# Patient Record
Sex: FEMALE | Race: WHITE | NOT HISPANIC OR LATINO | URBAN - METROPOLITAN AREA
[De-identification: names, ages, dates, MRNs, and addresses within clinical notes are randomized per-mention and may not be internally consistent; named-entity substitution may affect disease eponyms.]

---

## 2017-04-19 ENCOUNTER — ALLSCRIPTS OFFICE VISIT (OUTPATIENT)
Dept: OTHER | Facility: OTHER | Age: 67
End: 2017-04-19

## 2017-06-15 ENCOUNTER — ALLSCRIPTS OFFICE VISIT (OUTPATIENT)
Dept: OTHER | Facility: OTHER | Age: 67
End: 2017-06-15

## 2017-10-03 ENCOUNTER — GENERIC CONVERSION - ENCOUNTER (OUTPATIENT)
Dept: OTHER | Facility: OTHER | Age: 67
End: 2017-10-03

## 2017-10-19 ENCOUNTER — GENERIC CONVERSION - ENCOUNTER (OUTPATIENT)
Dept: OTHER | Facility: OTHER | Age: 67
End: 2017-10-19

## 2017-10-19 ENCOUNTER — LAB CONVERSION - ENCOUNTER (OUTPATIENT)
Dept: OTHER | Facility: OTHER | Age: 67
End: 2017-10-19

## 2017-10-19 LAB
T4 FREE SERPL-MCNC: 1.4 NG/DL (ref 0.8–1.8)
THYROGLOBULIN AB (HISTORICAL): <1 IU/ML
TSH SERPL DL<=0.05 MIU/L-ACNC: 0.4 MIU/L (ref 0.4–4.5)

## 2017-11-13 ENCOUNTER — HOSPITAL ENCOUNTER (OUTPATIENT)
Dept: RADIOLOGY | Age: 67
Discharge: HOME | End: 2017-11-13
Payer: COMMERCIAL

## 2017-11-13 DIAGNOSIS — C73 MALIGNANT NEOPLASM OF THYROID GLAND (HCC): ICD-10-CM

## 2017-11-13 PROCEDURE — 76536 US EXAM OF HEAD AND NECK: CPT

## 2017-11-17 ENCOUNTER — GENERIC CONVERSION - ENCOUNTER (OUTPATIENT)
Dept: OTHER | Facility: OTHER | Age: 67
End: 2017-11-17

## 2017-11-29 ENCOUNTER — LAB REQUISITION (OUTPATIENT)
Dept: LAB | Facility: HOSPITAL | Age: 67
End: 2017-11-29
Payer: COMMERCIAL

## 2017-11-29 ENCOUNTER — ALLSCRIPTS OFFICE VISIT (OUTPATIENT)
Dept: OTHER | Facility: OTHER | Age: 67
End: 2017-11-29

## 2017-11-29 DIAGNOSIS — Z01.419 ENCOUNTER FOR GYNECOLOGICAL EXAMINATION WITHOUT ABNORMAL FINDING: ICD-10-CM

## 2017-11-29 PROCEDURE — G0145 SCR C/V CYTO,THINLAYER,RESCR: HCPCS | Performed by: OBSTETRICS & GYNECOLOGY

## 2017-12-05 NOTE — PROGRESS NOTES
Assessment    1  Encounter for routine gynecological examination (V72 31) (Z01 419)   2  Pap smear, as part of routine gynecological examination (V76 2) (Z01 419)   3  Screening for osteoporosis (V82 81) (Z13 820)   4  Visit for screening mammogram (V76 12) (Z12 31)    Plan  Encounter for routine gynecological examination    · Follow-up visit in 1 year Evaluation and Treatment  Follow-up  Status: Hold For -  Scheduling  Requested for: [id number]   Ordered; For: Encounter for routine gynecological examination; Ordered By: Pura Basurto Performed:  Due: [id number]  Encounter for routine gynecological examination, Pap smear, as part of routine  gynecological examination    · (1) THIN PREP PAP WITH IMAGING; Status: In Progress - Specimen/Data  Collected,Retrospective By Protocol Authorization;   Done: [id number]   Perform:[facility] In Office Collection; Order Comments:CERVICAL/ENDOCERVICAL; CIB:[id number]; Last Updated By:Kimmie Jackman; 11/29/2017 10:14:24 AM;Ordered;  For:Encounter for routine gynecological examination, Pap smear, as part of routine gynecological examination; Ordered By:Tatum Wall; Maturation index required? : No  HPV? : if ASCUS  Screening for osteoporosis    · * DXA BONE DENSITY SPINE HIP AND PELVIS; Status:Hold For - Scheduling,Exact  Date,Retrospective By Protocol Authorization; Requested for:Oct 2018; Perform:[facility]; Due:[id number]; Last Updated By:Kimmie Jackman; 11/29/2017 10:14:24 AM;Ordered; For:Screening for osteoporosis; Ordered By:Tatum Wall; Visit for screening mammogram    · * MAMMO SCREENING BILATERAL W CAD; Status:Hold For - Scheduling,Exact  Date,Retrospective By Protocol Authorization; Requested for:Oct 2018; Perform:[facility]; Due:01Oct2020; Last Updated By:Kimmie Jackman; 11/29/2017 10:14:24 AM;Ordered; For:Visit for screening mammogram; Ordered By:Tatum Wall;     Discussion/Summary  healthy adult female Currently, she eats a healthy diet  cervical cancer screening is current Pap test was done today Breast cancer screening: monthly self breast exam was advised, mammogram is current and mammogram has been ordered  Colorectal cancer screening: colorectal cancer screening is current  Osteoporosis screening: bone mineral density testing is current  Advice and education were given regarding nutrition, aerobic exercise, calcium supplements, vitamin D supplements and sunscreen use  Normal GYN Exam    SBE Stressed    MAmmogram ordered    Pap SMear DOne    Follow-up GYN Exam in 1 year    Call if any problems develop during the interim       ****Use a PEDIATRIC SPECULUM  The patient has the current Goals: [address]  The patent has the current Barriers: None  Patient is able to Self-Care  PATIENT EDUCATION RECORD She was given the following educational materials:  Ideas for a Healthy Life Style  The treatment plan was reviewed with the patient/guardian  The patient/guardian understands and agrees with the treatment plan     Self Referrals: No      Chief Complaint  ANNUAL VISIT  PT HAS NO PROBLEM OR CONCERN  PT IS DOING VERY GOOD      History of Present Illness  HPI: tREATED FOR thyroid    No new medical problems     Normal Bowel & Bladder habits    No abdominal or pelvic pain   GYN , Adult Female [facility]: The patient is being seen for a gynecology evaluation  The last health maintenance visit was 1 year(s) ago  General Health: The patient's health since the last visit is described as good  She has regular dental visits  She denies vision problems  She denies hearing loss  Lifestyle:  She consumes a diverse and healthy diet  She does not have any weight concerns  She exercises regularly  She does not use tobacco  She denies drug use  Reproductive health: the patient is postmenopausal    Screening: cancer screening reviewed and current  metabolic screening reviewed and current  risk screening reviewed and current  Review of Systems    Constitutional: No fever, no chills, feels well, no tiredness, no recent weight gain or loss  ENT: no ear ache, no loss of hearing, no nosebleeds or nasal discharge, no sore throat or hoarseness  Cardiovascular: THYROID CA History, but no complaints of slow or fast heart rate, no chest pain, no palpitations, no leg claudication or lower extremity edema  Respiratory: no complaints of shortness of breath, no wheezing, no dyspnea on exertion, no orthopnea or PND  Breasts: no complaints of breast pain, breast lump or nipple discharge  Gastrointestinal: no complaints of abdominal pain, no constipation, no nausea or diarrhea, no vomiting, no bloody stools  Genitourinary: no complaints of dysuria, no incontinence, no pelvic pain, no dysmenorrhea, no vaginal discharge or abnormal vaginal bleeding  Musculoskeletal: no complaints of arthralgia, no myalgia, no joint swelling or stiffness, no limb pain or swelling  Integumentary: no complaints of skin rash or lesion, no itching or dry skin, no skin wounds  Neurological: no complaints of headache, no confusion, no numbness or tingling, no dizziness or fainting  Active Problems    1  Benign nevus (216 9) (D22 9)   2  Cherry angioma (448 1) (I78 1)   3  Encounter for routine gynecological examination (V72 31) (Z01 419)   4  Excessive cerumen in right ear canal (380 4) (H61 21)   5  Hyperlipidemia (272 4) (E78 5)   6  Impaired fasting glucose (790 21) (R73 01)   7  Long term use of drug (V58 69) (Z79 899)   8  Need for influenza vaccination (V04 81) (Z23)   9  Need for vaccination for pneumococcus (V03 82) (Z23)   10  Osteopenia (733 90) (M85 80)   11  Pap smear, as part of routine gynecological examination (V76 2) (Z01 419)   12  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)   13  Postsurgical hypothyroidism (244 0) (E89 0)   14  Screening for cardiovascular condition (V81 2) (Z13 6)   15   Screening for osteoporosis (V82 81) (Z13 820)   16   Visit for screening mammogram (V76 12) (Z12 31)    Past Medical History    · History of DEXA Body Composition Study Lumbosacral Spine, Femur, Forearm   · History of Encounter for routine gynecological examination (V72 31) (Z01 419)   · History of Encounter for screening mammogram for malignant neoplasm of breast  (V76 12) (Z12 31)   · History of Fecal occult blood test positive (792 1) (R19 5)   · History of seborrheic keratosis (V13 3) (Z87 2)   · History of Laboratory examination ordered as part of a routine general medical  examination (V72 62) (Z00 00)   · History of Mammogram   · History of Pap smear, as part of routine gynecological examination (V76 2) (Z01 419)   · 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)   · History of Reported Pap Smear   · History of Routine Gynecological Exam With Cervical Pap Smear (V72 31)   · History of Ulcerative colitis without complications (103 7) (T36 26)   · History of Vaginal atrophy (627 3) (N95 2)   · History of Visit for screening mammogram (V76 12) (Z12 31)    Surgical History    · History of Oral Surgery Tooth Extraction   · History of Thyroid Surgery Total Thyroidectomy    Family History  Mother    · Family history of Family Health Status Mother   · Family history of chronic obstructive pulmonary disease (V17 6) (Z82 5)   · Family history of Generalized seizure disorder   · Family history of Mother  At Age 66   · Family history of Transient ischemic attack (TIA), and cerebral infarction without residual  deficits  Father    · Family history of Family Health Status Father   · Family history of Family Health Status Of Sister - Alive   · Family history of hypertension (V17 49) (Z82 49)   · Family history of Murmurs   · Family history of Pacemaker Placement  Maternal Grandmother    · Family history of diabetes mellitus (V18 0) (Z83 3)  Family History    · Family history of Cancer    Social History    · Denied: History of Drug Use   · Feels safe at home   · Denied: History of Home Environment Domestic Violence   · Minimum alcohol consumption   · Never smoked tobacco (V49 89) (Z78 9)    Current Meds   1  Aspirin Adult Low Strength 81 MG Oral Tablet Delayed Release; TAKE 1 TABLET DAILY; Therapy: 82JVA8389 to  Requested for: 58QSV1143 Recorded   2  Caltrate 600+D Plus Minerals 600-800 MG-UNIT Oral Tablet; Take 1 tablet daily; Therapy: 64UYE9258 to Recorded   3  Centrum Silver Ultra Womens Oral Tablet; TAKE 1 TABLET DAILY; Therapy: ([id number]) to Recorded   4  Synthroid 75 MCG Oral Tablet; 1 Tab Mon-Sat, 1/2 Tab on Sunday; Therapy: 23OIA2427 to (Evaluate:14Apr2018)  Requested for: 19Apr2017; Last   Rx:19Apr2017 Ordered    Allergies    1  Mesalamine ENEM    2  No Known Environmental Allergies   3  No Known Food Allergies    Vitals   Recorded: 04USI4451 60:39JM   Systolic 553   Diastolic 74   Height 5 ft 7 in   Weight 130 lb 6 oz   BMI Calculated 20 42   BSA Calculated 1 69     Physical Exam    Constitutional   General appearance: No acute distress, well appearing and well nourished  Neck   Neck: Normal, supple, trachea midline, no masses  Thyroid: Normal, no thyromegaly  Pulmonary   Respiratory effort: No increased work of breathing or signs of respiratory distress  Auscultation of lungs: Clear to auscultation  Cardiovascular   Auscultation of heart: Normal rate and rhythm, normal S1 and S2, no murmurs  Peripheral vascular exam: Normal pulses Throughout  Genitourinary   External genitalia: Normal and no lesions appreciated  Vagina: Normal, no lesions or dryness appreciated  Urethra: Normal     Urethral meatus: Normal     Bladder: Normal, soft, non-tender and no prolapse or masses appreciated  Cervix: Normal, no palpable masses  Uterus: Normal, non-tender, not enlarged, and no palpable masses  Adnexa/parametria: Normal, non-tender and no fullness or masses appreciated      Anus, perineum, and rectum: Normal sphincter tone, no masses, and no prolapse  Chest   Breasts: Normal and no dimpling or skin changes noted  Abdomen   Abdomen: Normal, non-tender, and no organomegaly noted  Liver and spleen: No hepatomegaly or splenomegaly  Examination for hernias: No hernias appreciated  Stool sample for occult blood: Negative  Lymphatic   Palpation of lymph nodes in neck, axillae, groin and/or other locations: No lymphadenopathy or masses noted  Skin   Skin and subcutaneous tissue: Normal skin turgor and no rashes  Palpation of skin and subcutaneous tissue: Normal     Psychiatric   Orientation to person, place, and time: Normal     Mood and affect: Normal        Provider Comments  Provider Comments:   Patient retired      Cares for 80 yr old father      Future Appointments    Date/Time Provider Specialty Site   04/20/2018 11:10 AM KEEGAN Baeza  Endocrinology [facility]   11/30/2018 09:30 AM KEEGAN Tamez  Surgical Oncology [facility]   06/19/2018 10:30 AM KEEGAN Scott   Internal Medicine [facility]     Signatures   Electronically signed by : KEEGAN Tellez ; Nov 30 2017  1:47PM EST                       (Author)

## 2017-12-06 LAB
LAB AP GYN PRIMARY INTERPRETATION: NORMAL
Lab: NORMAL

## 2018-01-10 NOTE — RESULT NOTES
Verified Results  (1) THYROGLOBULIN/QUANT W/ANTIBODY PANEL 20Apr2016 04:38PM Lillian Lam   Performed at:  [address]  [id number]  : Leonid Hussein MD, Phone:  [phone]     Test Name Result Flag Reference   THYROGLOB AB <1 0 IU/mL  0 0 - 0 9   Thyroglobulin Antibody measured by Rayne Norman Methodology   THYROGLOBULIN-SOLITARIO <0 1 ng/mL L 1 5 - 38 5   According to the [facility] of Clinical Biochemistry, thereference interval for Thyroglobulin (TG) should be related toeuthyroid patients and not for patients who underwent thyroidectomy  TG reference intervals for these patients depend on the residualmass of the thyroid tissue left after surgery  Establishing apost-operative baseline is recommended  The assay limit of quantitation is 0 1 ng/mLThyroglobulin measured by Rayne Norman Immunometric Assay       Plan  Postsurgical hypothyroidism    · (1) T4, FREE; Status:Active; Requested for:22Oct2016;    · (1) TSH; Status:Active;  Requested for:22Oct2016;

## 2018-01-11 NOTE — RESULT NOTES
Verified Results  (Q) CBC (INCLUDES DIFF/PLT) (REFL) [id number] 07:48AM Prabhu Patiño   REPORT COMMENT:  REQ ON HOLD     Test Name Result Flag Reference   WHITE BLOOD CELL COUNT 4 6 Thousand/uL  3 8-10 8   RED BLOOD CELL COUNT 4 14 Million/uL  3 80-5 10   HEMOGLOBIN 12 2 g/dL  11 7-15 5   HEMATOCRIT 37 2 %  35 0-45 0   MCV 89 9 fL  80 0-100 0   MCH 29 6 pg  27 0-33 0   MCHC 32 9 g/dL  32 0-36 0   RDW 13 5 %  11 0-15 0   PLATELET COUNT 711 Thousand/uL  140-400   MPV 7 8 fL  7 5-11 5   ABSOLUTE NEUTROPHILS 2875 cells/uL  8436-6081   ABSOLUTE LYMPHOCYTES 1118 cells/uL  850-3900   ABSOLUTE MONOCYTES 396 cells/uL  200-950   ABSOLUTE EOSINOPHILS 202 cells/uL     ABSOLUTE BASOPHILS 9 cells/uL  0-200   NEUTROPHILS 62 5 %     LYMPHOCYTES 24 3 %     MONOCYTES 8 6 %     EOSINOPHILS 4 4 %     BASOPHILS 0 2 %       (Q) COMPREHENSIVE METABOLIC PNL W/ADJUSTED CALCIUM [id number] 07:48AM Prabhu Patiño     Test Name Result Flag Reference   GLUCOSE 94 mg/dL  65-99   Fasting reference interval   UREA NITROGEN (BUN) 15 mg/dL  7-25   CREATININE 0 67 mg/dL  0 50-0 99   For patients >52years of age, the reference limit  for Creatinine is approximately 13% higher for people  identified as -American  eGFR NON-AFR   AMERICAN 93 mL/min/1 73m2  > OR = 60   eGFR AFRICAN AMERICAN 108 mL/min/1 73m2  > OR = 60   BUN/CREATININE RATIO   0-54   NOT APPLICABLE (calc)   SODIUM 140 mmol/L  135-146   POTASSIUM 3 8 mmol/L  3 5-5 3   CHLORIDE 103 mmol/L     CARBON DIOXIDE 27 mmol/L  19-30   CALCIUM 9 5 mg/dL  8 6-10 4   CALCIUM (ADJUSTED FOR$ALBUMIN) 9 7 mg/dL (calc)  8 6-10 2   PROTEIN, TOTAL 6 9 g/dL  6 1-8 1   ALBUMIN 4 1 g/dL  3 6-5 1   GLOBULIN 2 8 g/dL (calc)  1 9-3 7   ALBUMIN/GLOBULIN RATIO 1 5 (calc)  1 0-2 5   BILIRUBIN, TOTAL 1 0 mg/dL  0 2-1 2   ALKALINE PHOSPHATASE 61 U/L     AST 24 U/L  10-35   ALT 16 U/L  6-29     (Q) LIPID PANEL WITH REFLEX TO DIRECT LDL [id number] 07:48AM Prabhu Patiño     Test Name Result Flag Reference   CHOLESTEROL, TOTAL 238 mg/dL H 125-200   HDL CHOLESTEROL 60 mg/dL  > OR = 46   TRIGLICERIDES 884 mg/dL  <150   LDL-CHOLESTEROL 152 mg/dL (calc) H <130   Desirable range <100 mg/dL for patients with CHD or  diabetes and <70 mg/dL for diabetic patients with  known heart disease  CHOL/HDLC RATIO 4 0 (calc)  < OR = 5 0   NON HDL CHOLESTEROL 178 mg/dL (calc) H    Target for non-HDL cholesterol is 30 mg/dL higher than   LDL cholesterol target

## 2018-01-11 NOTE — RESULT NOTES
Message   Normal     Verified Results  (1) T4, FREE 24Oct2016 09:48AM Babak, [employer]     Test Name Result Flag Reference   T4, FREE 1 3 ng/dL  0 8-1 8     (Q) TSH, 3RD GENERATION 24Oct2016 09:48AM Babak, [employer]   REPORT COMMENT:  FASTING:YES     Test Name Result Flag Reference   TSH 0 45 mIU/L  0 40-4 50

## 2018-01-13 VITALS
SYSTOLIC BLOOD PRESSURE: 130 MMHG | HEIGHT: 67 IN | BODY MASS INDEX: 20.46 KG/M2 | DIASTOLIC BLOOD PRESSURE: 74 MMHG | WEIGHT: 130.38 LBS

## 2018-01-13 NOTE — RESULT NOTES
Verified Results  (1) T4, FREE 20Apr2016 04:38PM Lillian Fritz     Test Name Result Flag Reference   T4,FREE 1 33 ng/dL  0 76-1 46     (1) TSH 20Apr2016 04:38PM Jameel Hilliard   Patients undergoing fluorescein dye angiography may retain small amounts of fluorescein in the body for 48-72 hours post procedure  Samples containing fluorescein can produce falsely depressed TSH values  If the patient had this procedure,a specimen should be resubmitted post fluorescein clearance          The recommended reference ranges for TSH during pregnancy are as follows:  First trimester 0 1 to 2 5 uIU/mL  Second trimester  0 2 to 3 0 uIU/mL  Third trimester 0 3 to 3 0 uIU/m     Test Name Result Flag Reference   TSH 0 401 uIU/mL  0 358-3 740

## 2018-01-13 NOTE — PROGRESS NOTES
Assessment    1  Encounter for preventive health examination (V70 0) (Z00 00)   2  Hyperlipidemia (272 4) (E78 5)   · We did discuss patient's cholesterol levels today she is a low-risk individual and      therefore an LDL goal of less than 160 is acceptable  We did give her step      one American Heart Association diet to try to do better for next year  3  Impaired fasting glucose (790 21) (R73 01)   · Patient's fasting blood sugar this year was less than 100  She will continue with      therapeutic lifestyle changes and laboratory surveillance   4  Osteopenia (733 90) (M85 80)   · Patient's osteopenia as managed by Dr Bhavana Lancaster she is due for a DEXA scan this August      and this has scheduled her ready   5  Never smoked tobacco (V49 89) (Z78 9)   6  Minimum alcohol consumption   7  Family history of chronic obstructive pulmonary disease (V17 6) (Z82 5) : Mother   8  Family history of Generalized seizure disorder : Mother   5  Family history of Transient ischemic attack (TIA), and cerebral infarction without residual   deficits : Mother   8  Family history of hypertension (V17 49) (Z82 49) : Father   6  Family history of Pacemaker Placement : Father   15  Family history of diabetes mellitus (V18 0) (Z83 3) : Maternal Grandmother   13  History of Oral Surgery Tooth Extraction   14  History of Thyroid Surgery Total Thyroidectomy   15  Long term use of drug (V58 69) (Z79 899)    Plan   Health Maintenance    · Always use a seat belt and shoulder strap when riding or driving a motor vehicle ;  Status:Complete;   Done: 80ZRT1991   · Begin a limited exercise program ; Status:Complete;   Done: 44WNS0948   · Brush your teeth freq1 and floss at least once a day ; Status:Complete;   Done:  80NWJ7055   · Use a sun block product with an SPF of 15 or more ; Status:Complete;   Done:  90FRP1311   · We recommend routine visits to a dentist ; Status:Complete;   Done: 68XNT5338   · Call [phone] if:  You find a new or different kind of lump in your breast ;  Status:Complete;   Done: 67DYH7814   · Call [phone] if: You have any bleeding from the vagina ; Status:Complete;    Done: 82WVF6785   · Call [phone] if: You have any warning signs of skin cancer ; Status:Complete;    Done: 18RWE6181   · Call 911 if: You experience a new kind of chest pain (angina) or pressure ;  Status:Complete;   Done: 96ITO5668  Health Maintenance, Hyperlipidemia, Impaired fasting glucose, Long term use of drug,  Osteopenia    · (Q) HEPATITIS C ANTIBODY; Status:Active; Requested BBY:39CGO3164;    · (Q) LIPID PANEL WITH REFLEX TO DIRECT LDL; Status:Active; Requested  ATU:79KQV9934;   Hyperlipidemia, Impaired fasting glucose, Long term use of drug, Osteopenia    · (Q) CBC (INCLUDES DIFF/PLT) (REFL); Status:Active; Requested [id number];    · (Q) COMPREHENSIVE METABOLIC PNL W/ADJUSTED CALCIUM; Status:Active; Requested FAP:77DOY7556; Follow-up visit in 1 year Evaluation and Treatment  Follow-up  Status: Hold For - Scheduling  Requested for: 45KYQ0607  Ordered; For: Health Maintenance, Hyperlipidemia, Impaired fasting glucose, Long term use of drug, Osteopenia; Ordered By: Leslee Watts  Performed:   Due: 65EEW0984     Discussion/Summary  health maintenance visit healthy adult female Currently, she eats an adequate diet and has an adequate exercise regimen  cervical cancer screening is current Breast cancer screening: mammogram is current  Colorectal cancer screening: colorectal cancer screening is current  Osteoporosis screening: bone mineral density testing is current  The immunizations are up to date  Advice and education were given regarding sunscreen use and seat belt use  Patient discussion: discussed with the patient  Chief Complaint  The patient presents for a wellness visit  History of Present Illness  HM, Adult Female: The patient is being seen for a health maintenance evaluation   The last health maintenance visit was 1 year(s) ago  Social History: Household members include father  Work status: occupation: Retired - Adminstrative assistant  The patient has never smoked cigarettes  She reports rare alcohol use  She has never used illicit drugs  General Health: The patient's health since the last visit is described as good  She has regular dental visits  The patient brushes 2 time(s) a day and flosses 1 time(s) a day  Dental problems: no tooth pain and no caries  She denies vision problems  Vision care includes wearing glasses  She denies hearing loss  Immunizations status: up to date  Lifestyle:  She does not have a healthy diet  She does not have any weight concerns  She exercises regularly  She does not use tobacco  She consumes alcohol  She denies drug use  Reproductive health: the patient is postmenopausal   she reports normal menses  she is not sexually active  Screening: Cervical cancer screening includes a pap smear performed November 2015  Breast cancer screening includes a mammogram performed September 2015  Colorectal cancer screening includes a colonoscopy performed 2013  Safety elements used: seat belt, safe driving habits, sunscreen and smoke detector  HPI: She is a pleasant 67-year-old lady who presents for a wellness visit accompanied by her father  She feels well  She performed blood work prior to her office visit  When asked, she states that her appetite is good and unchanged  Review of Systems    Constitutional: not feeling poorly  ENT: no hearing loss  Cardiovascular: the heart rate was not slow, no chest pain, no intermittent leg claudication, the heart rate was not fast, no palpitations and no lower extremity edema  Respiratory: no shortness of breath, no cough, no orthopnea, no wheezing, no shortness of breath during exertion and no PND  Gastrointestinal: no abdominal pain, no nausea, no vomiting, no constipation, no diarrhea and no blood in stools  Active Problems    1  Benign nevus (216 9) (D22 9)   2  Cherry angioma (448 1) (I78 1)   3  Encounter for routine gynecological examination (V72 31) (Z01 419)   4  Hyperlipidemia (272 4) (E78 5)   5  Impaired fasting glucose (790 21) (R73 01)   6  Long term use of drug (V58 69) (Z79 899)   7  Osteopenia (733 90) (M85 80)   8  Pap smear, as part of routine gynecological examination (V76 2) (Z01 419)   9  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)   10  Postsurgical hypothyroidism (244 0) (E89 0)   11  Screening for osteoporosis (V82 81) (Z13 820)   12   Visit for screening mammogram (V76 12) (Z12 31)    Past Medical History    · History of DEXA Body Composition Study Lumbosacral Spine, Femur, Forearm   · History of Encounter for routine gynecological examination (V72 31) (Z01 419)   · History of Encounter for screening mammogram for malignant neoplasm of breast  (V76 12) (Z12 31)   · History of Fecal occult blood test positive (792 1) (R19 5)   · History of seborrheic keratosis (V13 3) (Z87 2)   · History of Laboratory examination ordered as part of a routine general medical  examination (V72 62) (Z00 00)   · History of Mammogram   · History of Pap smear, as part of routine gynecological examination (V76 2) (Z01 419)   · 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)   · History of Reported Pap Smear   · History of Routine Gynecological Exam With Cervical Pap Smear (V72 31)   · History of Ulcerative colitis without complications (138 6) (G15 00)   · History of Vaginal atrophy (627 3) (N95 2)   · History of Visit for screening mammogram (V76 12) (Z12 31)    Surgical History    · History of Oral Surgery Tooth Extraction   · History of Thyroid Surgery Total Thyroidectomy    Family History  Mother    · Family history of Family Health Status Mother   · Family history of chronic obstructive pulmonary disease (V17 6) (Z82 5)   · Family history of Generalized seizure disorder   · Family history of Mother  At Age 66   · Family history of Transient ischemic attack (TIA), and cerebral infarction without residual  deficits  Father    · Family history of Family Health Status Father   · Family history of Family Health Status Of Sister - Alive   · Family history of hypertension (V17 49) (Z82 49)   · Family history of Murmurs   · Family history of Pacemaker Placement  Maternal Grandmother    · Family history of diabetes mellitus (V18 0) (Z83 3)  Family History    · Family history of Cancer    Social History    · Denied: History of Drug Use   · Feels safe at home   · Denied: History of Home Environment Domestic Violence   · Minimum alcohol consumption   · Never smoked tobacco (V49 89) (Z78 9)    Current Meds   1  Aspirin Adult Low Strength 81 MG Oral Tablet Delayed Release; TAKE 1 TABLET DAILY; Therapy: 76QJI0907 to  Requested for: 89JOJ8198 Recorded   2  Caltrate 600+D Plus Minerals 600-800 MG-UNIT Oral Tablet; Take 1 tablet daily; Therapy: 88QLP7440 to Recorded   3  Centrum Silver Ultra Womens Oral Tablet; TAKE 1 TABLET DAILY; Therapy: (0498 72 13 49) to Recorded   4  Synthroid 75 MCG Oral Tablet; 1 Tab Mon-Sat, 1/2 Tab on Sunday; Therapy: 81JKG2310 to (Evaluate:15Apr2017)  Requested for: 20Apr2016; Last   Rx:20Apr2016 Ordered    Allergies    1  Mesalamine ENEM    2  No Known Environmental Allergies   3  No Known Food Allergies    Vitals   Recorded: 13Jun2016 09:33AM   Heart Rate 70   Respiration 18   Systolic 785   Diastolic 82   Height 5 ft 7 in   Weight 142 lb 0 2 oz   BMI Calculated 22 24   BSA Calculated 1 75   O2 Saturation 99     Physical Exam    Constitutional   General appearance: No acute distress, well appearing and well nourished  well developed, appears healthy, comfortable, well nourished, within normal limits of ideal weight, clothing appropriate, rested, not exhausted, well hydrated and appearance reflects stated age  Eyes   Conjunctiva and lids: No swelling, erythema or discharge      Ears, Nose, Mouth, and Throat   External inspection of ears and nose: Normal     Otoscopic examination: Tympanic membranes translucent with normal light reflex  Canals patent without erythema  The right tympanic membrane was normal  The left tympanic membrane was normal  The right external canal was normal  The left external canal was normal  Exam of the right middle ear showed no middle ear effusion  Exam of the left middle ear showed no middle ear effusion  Hearing: Normal     Neck   Neck: Supple, symmetric, trachea midline, no masses  The neck was normal and was supple  The neck was not swollen and was non-tender  the trachea was midline  Thyroid: Normal, no thyromegaly  The thyroid was absent  There were no thyroid nodules  Pulmonary   Respiratory effort: No increased work of breathing or signs of respiratory distress  Respiratory rate: normal  Assessment of respiratory effort revealed normal rhythm and effort  Auscultation of lungs: Clear to auscultation  no rales or crackles were heard bilaterally  no rhonchi  no friction rub  no wheezing  no diminished breath sounds  no bronchial breath sounds  Cardiovascular   Auscultation of heart: Normal rate and rhythm, normal S1 and S2, no murmurs  The heart rate was normal  The rhythm was regular  no murmurs were heard  No bruits are heard over her bilateral carotid arteries  Examination of extremities for edema and/or varicosities: Normal   no pretibial edema  Abdomen   Abdomen: Non-tender, no masses  The abdomen was flat  The abdomen was soft and nontender  no masses palpated  Liver and spleen: No hepatomegaly or splenomegaly  No hepatosplenomegaly  Lymphatic   Palpation of lymph nodes in neck: No lymphadenopathy  no anterior cervical node enlargement, no posterior cervical node enlargement, no submandibular node enlargement and no supraclavicular node enlargement     Musculoskeletal   Gait and station: Normal     Psychiatric   Judgment and insight: Normal     Mood and affect: Normal        Results/Data  PHQ-2 Adult Depression Screening 13Jun2016 09:34AM User, Therons     Test Name Result Flag Reference   PHQ-2 Adult Depression Score 0     Over the last two weeks, how often have you been bothered by any of the following problems? Little interest or pleasure in doing things: Not at all - 0  Feeling down, depressed, or hopeless: Not at all - 0   PHQ-2 Adult Depression Screening Negative       (Q) CBC (INCLUDES DIFF/PLT) (REFL) [id number] 07:48AM Leolaamena Vazquezjose   REPORT COMMENT:  REQ ON HOLD     Test Name Result Flag Reference   WHITE BLOOD CELL COUNT 4 6 Thousand/uL  3 8-10 8   RED BLOOD CELL COUNT 4 14 Million/uL  3 80-5 10   HEMOGLOBIN 12 2 g/dL  11 7-15 5   HEMATOCRIT 37 2 %  35 0-45 0   MCV 89 9 fL  80 0-100 0   MCH 29 6 pg  27 0-33 0   MCHC 32 9 g/dL  32 0-36 0   RDW 13 5 %  11 0-15 0   PLATELET COUNT 234 Thousand/uL  140-400   MPV 7 8 fL  7 5-11 5   ABSOLUTE NEUTROPHILS 2875 cells/uL  1788-1341   ABSOLUTE LYMPHOCYTES 1118 cells/uL  850-3900   ABSOLUTE MONOCYTES 396 cells/uL  200-950   ABSOLUTE EOSINOPHILS 202 cells/uL     ABSOLUTE BASOPHILS 9 cells/uL  0-200   NEUTROPHILS 62 5 %     LYMPHOCYTES 24 3 %     MONOCYTES 8 6 %     EOSINOPHILS 4 4 %     BASOPHILS 0 2 %       (Q) COMPREHENSIVE METABOLIC PNL W/ADJUSTED CALCIUM [id number] 07:48AM Leolaamena Vazquezjose     Test Name Result Flag Reference   GLUCOSE 94 mg/dL  65-99   Fasting reference interval   UREA NITROGEN (BUN) 15 mg/dL  7-25   CREATININE 0 67 mg/dL  0 50-0 99   For patients >52years of age, the reference limit  for Creatinine is approximately 13% higher for people  identified as -American  eGFR NON-AFR   AMERICAN 93 mL/min/1 73m2  > OR = 60   eGFR AFRICAN AMERICAN 108 mL/min/1 73m2  > OR = 60   BUN/CREATININE RATIO   3-11   NOT APPLICABLE (calc)   SODIUM 140 mmol/L  135-146   POTASSIUM 3 8 mmol/L  3 5-5 3   CHLORIDE 103 mmol/L     CARBON DIOXIDE 27 mmol/L  19-30   CALCIUM 9 5 mg/dL 8  6-10 4   CALCIUM (ADJUSTED FOR$ALBUMIN) 9 7 mg/dL (calc)  8 6-10 2   PROTEIN, TOTAL 6 9 g/dL  6 1-8 1   ALBUMIN 4 1 g/dL  3 6-5 1   GLOBULIN 2 8 g/dL (calc)  1 9-3 7   ALBUMIN/GLOBULIN RATIO 1 5 (calc)  1 0-2 5   BILIRUBIN, TOTAL 1 0 mg/dL  0 2-1 2   ALKALINE PHOSPHATASE 61 U/L     AST 24 U/L  10-35   ALT 16 U/L  6-29     (Q) LIPID PANEL WITH REFLEX TO DIRECT LDL [id number] 07:48AM Arnoldo Banks     Test Name Result Flag Reference   CHOLESTEROL, TOTAL 238 mg/dL H 125-200   HDL CHOLESTEROL 60 mg/dL  > OR = 46   TRIGLICERIDES 780 mg/dL  <150   LDL-CHOLESTEROL 152 mg/dL (calc) H <130   Desirable range <100 mg/dL for patients with CHD or  diabetes and <70 mg/dL for diabetic patients with  known heart disease  CHOL/HDLC RATIO 4 0 (calc)  < OR = 5 0   NON HDL CHOLESTEROL 178 mg/dL (calc) H    Target for non-HDL cholesterol is 30 mg/dL higher than   LDL cholesterol target  Provider Comments   #1  Her laboratory test results were reviewed and discussed with her during her office visit  #2  Health maintenance-her allergy history, medication history, social history, family history, and past surgical history were reviewed with her and updated, as needed  A TLC care guide from the computer program was given to her to take to review  her laboratory testings were ordered for next year  I advised her to follow-up next year for a welcome to Medicare office visit or sooner, if needed  #3  Hyperlipidemia-we calculated her 10 year cardiovascular risk to be 5 5%  At this time, she wishes to continue with aggressive TLC and laboratory surveillance  She is not interested in medication for preventative care  #4  Impaired fasting glucose-her fasting blood sugar was 94  She will continue with TLC and laboratory surveillance  #5  Colon cancer screening-she is following with her GI specialist for preventative care and her next colonoscopy is due in September 2018    #6  GYN/breast care-she is following with her gynecologist for preventative care  #7Pop Davidson is following with her gynecologist for care and will be repeating a DEXA scan in late 2016  #8  Postsurgical hypothyroidism secondary to thyroid cancer-she is following with her endocrinologist and surgical oncologist for care  #9  Vaccine care-I advised her to receive the seasonal influenza vaccine and pneumococcal vaccine bertween [place] day and Thanksgiving of this year  #10  I advised her to follow-up in the office in one year for a welcome to Medicare office visit or sooner, if needed  This note was proofread on June 14, 2016  Health Management  Health Maintenance   COLONOSCOPY; every 5 years; Last 95GOU1408; Next Due: 52YLM4281; Active    Future Appointments    Date/Time Provider Specialty Site   04/19/2017 09:30 AM KEEGAN Love  Endocrinology [facility] ENDOCRINOLOGY   11/08/2016 09:00 AM KEEGAN Nice  Surgical Oncology [facility]ICAL ONCOLOGY   06/15/2017 09:00 AM Cherelle Argueta MD Internal Medicine [facility]     Signatures   Electronically signed by :  Donna Mancuso MD; Jun 14 2016  2:13PM EST                       (Author)

## 2018-01-14 VITALS
WEIGHT: 133.13 LBS | DIASTOLIC BLOOD PRESSURE: 78 MMHG | SYSTOLIC BLOOD PRESSURE: 138 MMHG | HEIGHT: 67 IN | HEART RATE: 78 BPM | BODY MASS INDEX: 20.9 KG/M2

## 2018-01-15 NOTE — MISCELLANEOUS
Message  Spoke with patient regarding DEXA scan results  Mild osteopenia in both hips; forearm normal  Recommended patient to continue with calcium + vitamin D daily  Next scan in 2 years        Signatures   Electronically signed by : MARCK Gaviria; Sep 28 2016  9:27AM EST                       (Author)

## 2018-01-15 NOTE — PROGRESS NOTES
Assessment    1  Encounter for preventive health examination (V70 0) (Z00 00)   2  Screening for cardiovascular condition (V81 2) (Z13 6)   3  Excessive cerumen in right ear canal (380 4) (H61 21)    Plan  Hyperlipidemia, Impaired fasting glucose, Postsurgical hypothyroidism    · (Q) CBC (H/H, RBC, INDICES, WBC, PLT); Status:Active; Requested VOZ:[id number];    · (Q) COMPREHENSIVE METABOLIC PNL W/ADJUSTED CALCIUM; Status:Active; Requested QDL:[id number];    · (Q) LIPID PANEL WITH REFLEX TO DIRECT LDL; Status:Active; Requested  LGU:[id number];   Screening for cardiovascular condition    · EKG/ECG- POC; Status:Complete;   Done: 19OZJ8939 01:31PM    Discussion/Summary    Normal EKG  RTO 1 year  Impression: Welcome to Medicare Visit, with preventive exam as well as age and risk appropriate counseling completed  Cardiovascular screening and counseling: EKG recommended and Dx - V81 2 Screen for CV Disorder  Diabetes screening and counseling: screening is current  Colorectal cancer screening and counseling: screening is current, colorectal cancer screening due every 5 year(s) and Due 2018  Breast cancer screening and counseling: screening is current  Cervical cancer screening and counseling: screening is current  Osteoporosis screening and counseling: screening is current  Abdominal aortic aneurysm screening and counseling: screening not indicated  Glaucoma screening and counseling: screening is current  HIV screening and counseling: screening not indicated  Immunizations: influenza vaccine is up to date this year, Pneumovax in Sept and Zostavax vaccination up to date  Advance Directive Planning: complete and up to date  Patient Discussion: plan discussed with the patient, follow-up visit needed in one year  Possible side effects of new medications were reviewed with the patient/guardian today  The treatment plan was reviewed with the patient/guardian   The patient/guardian understands and agrees with the treatment plan      Chief Complaint  Welcome to Medicare Wellness      History of Present Illness  Welcome to Medicare and Wellness Visits: The patient is being seen for the welcome to medicare visit  Medicare Screening and Risk Factors   Hospitalizations: no previous hospitalizations  Once per lifetime medicare screening tests: ECG has not been done  Medicare Screening Tests Risk Questions   Abdominal aortic aneurysm risk assessment: none indicated  Osteoporosis risk assessment: , female gender and over 48years of age  HIV risk assessment: none indicated  Drug and Alcohol Use: The patient has never smoked cigarettes  The patient reports never drinking alcohol  Alcohol concern:   The patient has no concerns about alcohol abuse  She has never used illicit drugs  Diet and Physical Activity: Current diet includes 1-2 servings of fruit per day, 1 servings of meat per day, 0 servings of whole grains per day, 7 5oz cans of regular soda per day and 4-5 glasses of water  She exercises daily  Exercise: walking 25 minutes per day  Mood Disorder and Cognitive Impairment Screening: Geriatric Depression Scale   Depression screening  negative for symptoms  She denies feeling down, depressed, or hopeless over the past two weeks  She denies feeling little interest or pleasure in doing things over the past two weeks  Cognitive impairment screening: denies difficulty learning/retaining new information, denies difficulty handling complex tasks, denies difficulty with reasoning, denies difficulty with spatial ability and orientation, denies difficulty with language and denies difficulty with behavior  Functional Ability/Level of Safety: Hearing is normal bilaterally and a hearing aid is not used  She denies hearing difficulties   The patient is currently able to do activities of daily living without limitations, able to do instrumental activities of daily living without limitations, able to participate in social activities without limitations and able to drive without limitations  Activities of daily living details: does not need help using the phone, no transportation help needed, does not need help shopping, no meal preparation help needed, does not need help doing housework, does not need help doing laundry, does not need help managing medications and does not need help managing money  Fall risk factors: The patient fell 0 times in the past 12 months , no polypharmacy, no alcohol use, no mobility impairment, no antidepressant use, no deconditioning, no postural hypotension, no sedative use, no visual impairment, no urinary incontinence, no antihypertensive use, no cognitive impairment, up and go test was normal and no previous fall  Home safety risk factors:  no unfamiliar surroundings, no loose rugs, no poor household lighting, no uneven floors, no household clutter, grab bars in the bathroom and handrails on the stairs  Advance Directives: Advance directives: living will and durable power of  for health care directives  Co-Managers and Medical Equipment/Suppliers: See Patient Care Team   Preventive Quality Program 65 and Older: Falls Risk: The patient fell 0 times in the past 12 months  The patient currently has no urinary incontinence symptoms  Patient Care Team    Care Team Member Role Specialty Office Number   [facility] Specialist Gastroenterology Adult [phone]   Blu Connor MD Specialist Endocrinology [phone]   Andrea Jeronimo MD Specialist Obstetrics/Gynecology [phone]   Lenny Elliott MD Specialist Surgical Oncology [phone]   Trey Gonzalez MD Specialist Ophthalmology [phone]     Review of Systems    Constitutional: no fever and no chills  ENT: no hearing loss, no nasal discharge and no sore throat  Cardiovascular: no chest pain and no palpitations  Respiratory: no shortness of breath and no cough     Gastrointestinal: no abdominal pain, no diarrhea and no constipation  Genitourinary: denies incontinence, but no dysuria  Psychiatric: no anxiety and no depression  Active Problems    1  Benign nevus (216 9) (D22 9)   2  Cherry angioma (448 1) (I78 1)   3  Encounter for routine gynecological examination (V72 31) (Z01 419)   4  Hyperlipidemia (272 4) (E78 5)   5  Impaired fasting glucose (790 21) (R73 01)   6  Long term use of drug (V58 69) (Z79 899)   7  Need for influenza vaccination (V04 81) (Z23)   8  Need for vaccination for pneumococcus (V03 82) (Z23)   9  Osteopenia (733 90) (M85 80)   10  Pap smear, as part of routine gynecological examination (V76 2) (Z01 419)   11  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)   12  Postsurgical hypothyroidism (244 0) (E89 0)   13  Screening for osteoporosis (V82 81) (Z13 820)   14   Visit for screening mammogram (V76 12) (Z12 31)    Past Medical History    · History of DEXA Body Composition Study Lumbosacral Spine, Femur, Forearm   · History of Encounter for routine gynecological examination (V72 31) (Z01 419)   · History of Encounter for screening mammogram for malignant neoplasm of breast  (V76 12) (Z12 31)   · History of Fecal occult blood test positive (792 1) (R19 5)   · History of seborrheic keratosis (V13 3) (Z87 2)   · History of Laboratory examination ordered as part of a routine general medical  examination (V72 62) (Z00 00)   · History of Mammogram   · History of Pap smear, as part of routine gynecological examination (V76 2) (Z01 419)   · 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)   · History of Reported Pap Smear   · History of Routine Gynecological Exam With Cervical Pap Smear (V72 31)   · History of Ulcerative colitis without complications (666 3) (Z29 58)   · History of Vaginal atrophy (627 3) (N95 2)   · History of Visit for screening mammogram (V76 12) (Z12 31)    The active problems and past medical history were reviewed and updated today       Surgical History    · History of Oral Surgery Tooth Extraction   · History of Thyroid Surgery Total Thyroidectomy    The surgical history was reviewed and updated today  Family History  Mother    · Family history of Family Health Status Mother   · Family history of chronic obstructive pulmonary disease (V17 6) (Z82 5)   · Family history of Generalized seizure disorder   · Family history of Mother  At Age 66   · Family history of Transient ischemic attack (TIA), and cerebral infarction without residual  deficits  Father    · Family history of Family Health Status Father   · Family history of Family Health Status Of Sister - Alive   · Family history of hypertension (V17 49) (Z82 49)   · Family history of Murmurs   · Family history of Pacemaker Placement  Maternal Grandmother    · Family history of diabetes mellitus (V18 0) (Z83 3)  Family History    · Family history of Cancer    The family history was reviewed and updated today  Social History    · Denied: History of Drug Use   · Feels safe at home   · Denied: History of Home Environment Domestic Violence   · Minimum alcohol consumption   · Never smoked tobacco (V49 89) (Z78 9)  The social history was reviewed and updated today  Current Meds   1  Aspirin Adult Low Strength 81 MG Oral Tablet Delayed Release; TAKE 1 TABLET DAILY; Therapy: 03KCE9832 to  Requested for: 31DAR9070 Recorded   2  Caltrate 600+D Plus Minerals 600-800 MG-UNIT Oral Tablet; Take 1 tablet daily; Therapy: 60OPF1813 to Recorded   3  Centrum Silver Ultra Womens Oral Tablet; TAKE 1 TABLET DAILY; Therapy: (0498 72 13 49) to Recorded   4  Synthroid 75 MCG Oral Tablet; 1 Tab Mon-Sat, 1/2 Tab on ; Therapy: 67CHG8032 to (882 4511)  Requested for: 2017; Last   Rx:2017 Ordered    The medication list was reviewed and updated today  Allergies    1  Mesalamine ENEM    2  No Known Environmental Allergies   3   No Known Food Allergies    Immunizations   1 2    DTP/DTaP  23-May-2008  (57y)     Influenza  08-Oct-2016  (66y)     PCV  16-Sep-2016  (66y)     PPSV  20-Aug-2010  (59y)     Td/DT  Oct 1998  (48y) 23-May-2008  (57y)    Zoster  22-Jun-2012  (61y)      Vitals  Signs    Systolic: 244  Diastolic: 80   Temperature: 98 2 F  Heart Rate: 82  Respiration: 16  Systolic: 549  Diastolic: 70  Height: 5 ft 7 in  Weight: 130 lb 0 2 oz  BMI Calculated: 20 36  BSA Calculated: 1 68  O2 Saturation: 97    Physical Exam    Constitutional   General appearance: No acute distress, well appearing and well nourished  Head and Face   Head and face: Normal     Eyes   Conjunctiva and lids: No swelling, erythema or discharge  Ears, Nose, Mouth, and Throat   Otoscopic examination: Abnormal   The right external canal had a cerumen impaction  The left external canal was normal    Oropharynx: Normal with no erythema, edema, exudate or lesions  Neck   Neck: Supple, symmetric, trachea midline, no masses  Pulmonary   Respiratory effort: No increased work of breathing or signs of respiratory distress  Auscultation of lungs: Clear to auscultation  Cardiovascular   Auscultation of heart: Normal rate and rhythm, normal S1 and S2, no murmurs  Examination of extremities for edema and/or varicosities: Normal     Abdomen   Abdomen: Non-tender, no masses  Liver and spleen: No hepatomegaly or splenomegaly  Lymphatic   Palpation of lymph nodes in neck: No lymphadenopathy  Musculoskeletal   Gait and station: Normal     Psychiatric   Orientation to person, place, and time: Normal     Mood and affect: Normal        Results/Data  EKG/ECG- POC [id number] 01:31PM Usman Lai     Test Name Result Flag Reference   EKG/ECG 6/15/17         Procedure    Procedure: Visual Acuity Test    Indication: routine screening  Inforrmation supplied by  a Snellen chart     Results: 20/25 in both eyes with corrective device, 20/25 in the right eye with corrective device, 20/50 in the left eye with corrective device normal in both eyes  Procedure: cerumen removal    Indication: tympanic membrane(s) could not be visualized and cerumen impaction in the right ear  Prep: hydrogen peroxide was placed in the canal prior to the procedure  Procedure Note: The procedure was performed by the Provider  A otoscope was placed in the ear canal(s) to visualize the ear canal debris  The ear was cleaned by using warm water irrigation  The procedure was successful  Post-Procedure:   Patient Status: the patient tolerated the procedure well  Complications: there were no complications  Health Management  Health Maintenance   COLONOSCOPY; every 5 years; Last 76AWW7601; Next Due: 80OWT9325; Active    Future Appointments    Date/Time Provider Specialty Site   04/19/2018 09:30 AM KEEGAN Darby  Endocrinology [facility] ENDOCRINOLOGY   11/17/2017 09:30 AM KEEGAN Cody   Surgical Oncology [facility]     Signatures   Electronically signed by : KEEGAN Burris ; Jomar 15 2017  2:07PM EST                       (Author)

## 2018-01-16 NOTE — RESULT NOTES
Discussion/Summary   Normal thyroid testing     Verified Results  (1) T4, FREE 18Oct2017 11:45AM Babak, [employer]     Test Name Result Flag Reference   T4, FREE 1 4 ng/dL  0 8-1 8     (1) ANTITHYROGLOBULIN ANTIBODY 18Oct2017 11:45AM Babak, [employer]     Test Name Result Flag Reference   THYROGLOBULIN ANTIBODIES <1 IU/mL  < or = 1     (Q) TSH, 3RD GENERATION 18Oct2017 11:45AM Babak, [employer]   REPORT COMMENT:  FASTING:YES  AN UPDATE OR CORRECTION HAS BEEN MADE TO NAME     Test Name Result Flag Reference   TSH 0 40 mIU/L  0 40-4 50

## 2018-01-22 VITALS
WEIGHT: 130.01 LBS | HEART RATE: 82 BPM | RESPIRATION RATE: 16 BRPM | SYSTOLIC BLOOD PRESSURE: 136 MMHG | OXYGEN SATURATION: 97 % | BODY MASS INDEX: 20.4 KG/M2 | DIASTOLIC BLOOD PRESSURE: 80 MMHG | HEIGHT: 67 IN | TEMPERATURE: 98.2 F

## 2018-01-22 VITALS
OXYGEN SATURATION: 97 % | TEMPERATURE: 98.4 F | HEIGHT: 67 IN | RESPIRATION RATE: 16 BRPM | WEIGHT: 128 LBS | BODY MASS INDEX: 20.09 KG/M2 | SYSTOLIC BLOOD PRESSURE: 152 MMHG | HEART RATE: 78 BPM | DIASTOLIC BLOOD PRESSURE: 84 MMHG

## 2018-04-19 RX ORDER — CA/D3/MAG OX/ZINC/COP/MANG/BOR 600 MG-800
1 TABLET,CHEWABLE ORAL DAILY
COMMUNITY
Start: 2013-06-10 | End: 2019-06-25

## 2018-04-19 RX ORDER — LEVOTHYROXINE SODIUM 0.07 MG/1
TABLET ORAL
COMMUNITY
Start: 2014-10-30 | End: 2018-04-20 | Stop reason: SDUPTHER

## 2018-04-19 RX ORDER — ASPIRIN 81 MG/1
1 TABLET ORAL DAILY
COMMUNITY
Start: 2012-02-17

## 2018-04-20 ENCOUNTER — OFFICE VISIT (OUTPATIENT)
Dept: ENDOCRINOLOGY | Facility: CLINIC | Age: 68
End: 2018-04-20
Payer: COMMERCIAL

## 2018-04-20 VITALS
BODY MASS INDEX: 19.98 KG/M2 | HEIGHT: 67 IN | HEART RATE: 82 BPM | SYSTOLIC BLOOD PRESSURE: 130 MMHG | WEIGHT: 127.3 LBS | DIASTOLIC BLOOD PRESSURE: 58 MMHG

## 2018-04-20 DIAGNOSIS — Z85.850 HISTORY OF PAPILLARY ADENOCARCINOMA OF THYROID: Primary | ICD-10-CM

## 2018-04-20 DIAGNOSIS — E89.0 POSTSURGICAL HYPOTHYROIDISM: ICD-10-CM

## 2018-04-20 PROCEDURE — 99213 OFFICE O/P EST LOW 20 MIN: CPT | Performed by: INTERNAL MEDICINE

## 2018-04-20 RX ORDER — LEVOTHYROXINE SODIUM 0.07 MG/1
75 TABLET ORAL
Qty: 90 TABLET | Refills: 3 | Status: SHIPPED | OUTPATIENT
Start: 2018-04-20 | End: 2018-04-20 | Stop reason: SDUPTHER

## 2018-04-20 NOTE — PROGRESS NOTES
Mike Imus 78 y o  female MRN: [id number]    Encounter: [id number]      Assessment/Plan     Assessment: This is a 78y o -year-old female with thyroid cancer and hypothyroidism  Plan:  1  Thyroid cancer-she is in remission at this point  Check thyroglobulin level  2   Hypothyroidism-check TSH free T4  Continue current thyroid hormone replacement  CC:   Thyroid cancer and hypothyroidism    History of Present Illness      HPI:  73-year-old woman with papillary thyroid cancer status post total thyroidectomy about eight years ago  Today, she feels well and has no complaints  For the hypothyroidism, she is on thyroid hormone replacement  She denies any symptoms of hypo or hyperthyroidism  She does complain of food not tasting right  Review of Systems   Constitutional: Negative for chills and fever  Respiratory: Negative for shortness of breath  Cardiovascular: Negative for chest pain  Gastrointestinal: Negative for constipation, diarrhea, nausea and vomiting  Endocrine: Negative for cold intolerance and heat intolerance  Neurological: Negative for dizziness  All other systems reviewed and are negative        Historical Information   Past Medical History:   Diagnosis Date    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[facility] )     last assessed 11/17/17    Seborrheic keratosis     last assessed 5/1/15, resolved 11/2/15    Stool guaiac positive     last assessed 11/19/14, resolved 11/2/15    Ulcerative colitis without complications ([facility] )     Vaginal atrophy     last assessed 11/19/14, resolved 11/2/15     Past Surgical History:   Procedure Laterality Date    THYROIDECTOMY      last assessed 6/13/16    TOOTH EXTRACTION      last assessed 6/13/16     Social History   History   Alcohol Use    Yes     Comment: Minimum consumption      History   Drug Use No     History   Smoking Status    Never Smoker   Smokeless Tobacco    Never Used     Family History:   Family History   Problem Relation Age of Onset    COPD Mother     Seizures Mother     Transient ischemic attack Mother      & cerebral infarction    Hypertension Father     Heart murmur Father     Heart disease Father      pacemaker placement    Diabetes Maternal Grandmother     Cancer Family        Meds/Allergies   Current Outpatient Prescriptions   Medication Sig Dispense Refill    aspirin (ASPIRIN ADULT LOW STRENGTH) 81 mg EC tablet Take 1 tablet by mouth daily      Calcium Carbonate-Vit D-Min (CALTRATE 600+D PLUS MINERALS) 600-800 MG-UNIT CHEW Chew 1 tablet daily      levothyroxine (SYNTHROID) 75 mcg tablet Take by mouth      Multiple Vitamins-Minerals (CENTRUM SILVER ULTRA WOMENS PO) Take 1 tablet by mouth daily       No current facility-administered medications for this visit  Allergies   Allergen Reactions    Mesalamine Hives and Rash     [facility] - [id number]: Allergy is to Suppository       Objective   Vitals: Blood pressure 130/58, pulse 82, height 5' 7" (1 702 m), weight 57 7 kg (127 lb 4 8 oz)  Physical Exam   Constitutional: She is oriented to person, place, and time  She appears well-developed and well-nourished  No distress  HENT:   Head: Normocephalic and atraumatic  Mouth/Throat: Oropharynx is clear and moist and mucous membranes are normal  No oropharyngeal exudate  Eyes: Conjunctivae, EOM and lids are normal  Right eye exhibits no discharge  Left eye exhibits no discharge  No scleral icterus  Neck: Neck supple  The thyroid is absent  Cardiovascular: Normal rate, regular rhythm and normal heart sounds  Exam reveals no gallop and no friction rub  No murmur heard  Pulmonary/Chest: Effort normal and breath sounds normal  No respiratory distress  She has no wheezes  Abdominal: Soft  Bowel sounds are normal  She exhibits no distension  There is no tenderness  Musculoskeletal: Normal range of motion  She exhibits no edema, tenderness or deformity     Lymphadenopathy:        Head (right side): No occipital adenopathy present  Head (left side): No occipital adenopathy present  Right: No supraclavicular adenopathy present  Left: No supraclavicular adenopathy present  Neurological: She is alert and oriented to person, place, and time  No cranial nerve deficit  Skin: Skin is warm and intact  No rash noted  She is not diaphoretic  No erythema  Psychiatric: She has a normal mood and affect  Vitals reviewed  The history was obtained from the review of the chart, patient  Lab Results:   Lab Results   Component Value Date/Time    TSH 3RD GENERATON 0 40 10/18/2017 11:45 AM    Free T4 1 4 10/18/2017 11:45 AM    THYROGLOBULIN AB <1 10/18/2017 11:45 AM     Imaging Studies:  Results for orders placed during the hospital encounter of 11/13/17   US head neck lymph node mapping    Impression No evidence of recurrent or metastatic disease  Workstation performed: [id number]        I have personally reviewed pertinent reports  Portions of the record may have been created with voice recognition software  Occasional wrong word or "sound a like" substitutions may have occurred due to the inherent limitations of voice recognition software  Read the chart carefully and recognize, using context, where substitutions have occurred

## 2018-04-23 RX ORDER — LEVOTHYROXINE SODIUM 75 MCG
75 TABLET ORAL
Qty: 90 TABLET | Refills: 0 | Status: SHIPPED | OUTPATIENT
Start: 2018-04-23 | End: 2018-04-30 | Stop reason: SDUPTHER

## 2018-04-30 DIAGNOSIS — E89.0 POSTSURGICAL HYPOTHYROIDISM: ICD-10-CM

## 2018-04-30 RX ORDER — LEVOTHYROXINE SODIUM 75 MCG
75 TABLET ORAL
Qty: 90 TABLET | Refills: 2 | Status: SHIPPED | OUTPATIENT
Start: 2018-04-30 | End: 2019-04-24 | Stop reason: SDUPTHER

## 2018-05-25 LAB
REF LAB TEST NAME: NORMAL
REF LAB TEST: NORMAL
SL AMB CLIENT CONTACT: NORMAL
T4 FREE SERPL-MCNC: 1.5 NG/DL (ref 0.8–1.8)
THYROGLOB AB SERPL-ACNC: <0.1 NG/ML
THYROGLOB AB SERPL-ACNC: <1 IU/ML
THYROGLOB AB SERPL-ACNC: <1 IU/ML
TSH SERPL-ACNC: 0.14 MIU/L (ref 0.4–4.5)

## 2018-05-25 NOTE — PROGRESS NOTES
Please call the patient regarding her abnormal result  Thyroglobulin remains undetectable  This suggests no evidence of recurrence of thyroid cancer

## 2018-05-25 NOTE — PROGRESS NOTES
Please call the patient regarding her abnormal result  TSH is appropriately suppressed  Await thyroglobulin level

## 2018-05-29 ENCOUNTER — TELEPHONE (OUTPATIENT)
Dept: ENDOCRINOLOGY | Facility: CLINIC | Age: 68
End: 2018-05-29

## 2018-05-29 NOTE — TELEPHONE ENCOUNTER
----- Message from Emily Briggs MD sent at 5/25/2018 10:19 AM EDT -----  Please call the patient regarding her abnormal result  TSH is appropriately suppressed  Await thyroglobulin level

## 2018-06-29 ENCOUNTER — OFFICE VISIT (OUTPATIENT)
Dept: INTERNAL MEDICINE CLINIC | Facility: CLINIC | Age: 68
End: 2018-06-29
Payer: COMMERCIAL

## 2018-06-29 VITALS
OXYGEN SATURATION: 99 % | BODY MASS INDEX: 20.09 KG/M2 | HEART RATE: 72 BPM | SYSTOLIC BLOOD PRESSURE: 138 MMHG | HEIGHT: 66 IN | WEIGHT: 125 LBS | DIASTOLIC BLOOD PRESSURE: 82 MMHG

## 2018-06-29 DIAGNOSIS — E78.2 MIXED HYPERLIPIDEMIA: ICD-10-CM

## 2018-06-29 DIAGNOSIS — Z00.00 MEDICARE ANNUAL WELLNESS VISIT, SUBSEQUENT: Primary | ICD-10-CM

## 2018-06-29 DIAGNOSIS — Z23 NEED FOR TDAP VACCINATION: ICD-10-CM

## 2018-06-29 DIAGNOSIS — E89.0 POSTOPERATIVE HYPOTHYROIDISM: ICD-10-CM

## 2018-06-29 DIAGNOSIS — M85.80 OSTEOPENIA, UNSPECIFIED LOCATION: ICD-10-CM

## 2018-06-29 PROCEDURE — 1101F PT FALLS ASSESS-DOCD LE1/YR: CPT | Performed by: INTERNAL MEDICINE

## 2018-06-29 PROCEDURE — 90715 TDAP VACCINE 7 YRS/> IM: CPT

## 2018-06-29 PROCEDURE — 3008F BODY MASS INDEX DOCD: CPT | Performed by: INTERNAL MEDICINE

## 2018-06-29 PROCEDURE — 90471 IMMUNIZATION ADMIN: CPT

## 2018-06-29 PROCEDURE — 1160F RVW MEDS BY RX/DR IN RCRD: CPT | Performed by: INTERNAL MEDICINE

## 2018-06-29 PROCEDURE — 1160F RVW MEDS BY RX/DR IN RCRD: CPT

## 2018-06-29 PROCEDURE — G0439 PPPS, SUBSEQ VISIT: HCPCS | Performed by: INTERNAL MEDICINE

## 2018-06-29 NOTE — PROGRESS NOTES
Assessment and Plan:    Problem List Items Addressed This Visit     Medicare annual wellness visit, subsequent - Primary    Hyperlipidemia    Relevant Orders    Lipid panel    Osteopenia    Relevant Orders    Vitamin D 25 hydroxy    Postoperative hypothyroidism    Relevant Orders    CBC and differential    Comprehensive metabolic panel        Health Maintenance Due   Topic Date Due    Depression Screening PHQ-9  1950    DTaP,Tdap,and Td Vaccines (2 - Tdap) 05/23/2018    CRC Screening: Colonoscopy  09/19/2018         HPI:  Pedro Mendoza is a 78 y o  female here for her Initial Wellness Visit      Patient Active Problem List   Diagnosis    Medicare annual wellness visit, subsequent    Hyperlipidemia    Impaired fasting glucose    Osteopenia    Postoperative hypothyroidism     Past Medical History:   Diagnosis Date    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[facility] )     last assessed 11/17/17    Seborrheic keratosis     last assessed 5/1/15, resolved 11/2/15    Stool guaiac positive     last assessed 11/19/14, resolved 11/2/15    Ulcerative colitis without complications ([facility] )     Vaginal atrophy     last assessed 11/19/14, resolved 11/2/15     Past Surgical History:   Procedure Laterality Date    THYROIDECTOMY      last assessed 6/13/16    TOOTH EXTRACTION      last assessed 6/13/16     Family History   Problem Relation Age of Onset    COPD Mother     Seizures Mother     Transient ischemic attack Mother         & cerebral infarction    Hypertension Father     Heart murmur Father     Heart disease Father         pacemaker placement    Diabetes Maternal Grandmother     Cancer Family      History   Smoking Status    Never Smoker   Smokeless Tobacco    Never Used     History   Alcohol Use    Yes     Comment: Minimum consumption       History   Drug Use No       Current Outpatient Prescriptions   Medication Sig Dispense Refill    aspirin (ASPIRIN ADULT LOW STRENGTH) 81 mg EC tablet Take 1 tablet by mouth daily      Calcium Carbonate-Vit D-Min (CALTRATE 600+D PLUS MINERALS) 600-800 MG-UNIT CHEW Chew 1 tablet daily      Multiple Vitamins-Minerals (CENTRUM SILVER ULTRA WOMENS PO) Take 1 tablet by mouth daily      SYNTHROID 75 MCG tablet Take 1 tablet (75 mcg total) by mouth daily in the early morning 90 tablet 2     No current facility-administered medications for this visit  Allergies   Allergen Reactions    Mesalamine Hives and Rash     Annotation - [id number]:  Allergy is to Suppository     Immunization History   Administered Date(s) Administered    DTaP 5 05/23/2008    Influenza Split High Dose Preservative Free IM 10/08/2016, 10/07/2017    Pneumococcal Conjugate 13-Valent 09/16/2016    Pneumococcal Polysaccharide PPV23 08/20/2010, 09/19/2017    Td (adult), adsorbed 10/01/1998, 05/23/2008    Zoster 06/22/2012       Patient Care Team:  Bautista Miguel MD as PCP - MD Tiffanie Duong MD Obed Cooley, MD Maree Retort, MD Francesco Fordyce, MD      Medicare Screening Tests and Risk Assessments:  AWV Clinical     ISAR:   Previous hospitalizations?:  No       Once in a Lifetime Medicare Screening:   EKG performed?:  Yes    AAA screening performed? (if performed, please add date to Health Maintenance):  No       Medicare Screening Tests and Risk Assessment:   AAA Risk Assessment    None Indicated:  Yes    Osteoporosis Risk Assessment     Female:  Yes   :  Yes :  No   Age over 48:  Yes Low body weight (<127lbs):  Yes   Tobacco use:  No Alcohol use:  No   HIV Risk Assessment    None indicated:  Yes        Drug and Alcohol Use:   Tobacco use    Cigarettes:  never smoker    Tobacco use duration    Tobacco Cessation Readiness    Alcohol use    Alcohol use:  never    Alcohol Treatment Readiness   Illicit Drug Use    Drug type:  no sedative use       Diet & Exercise:   Diet   What is your diet?:  Regular   How many servings a day of the following:   Exercise    Do you currently exercise?:  yes    Frequency:  never       Cognitive Impairment Screening:   Depression screening preformed:  Yes    Depression screening results:  no significant symptoms   Cognitive Impairment Screening    Do you have difficulty learning or retaining new information?:  No Do you have difficulty handling new tasks?:  No   Do you have difficulty with reasoning?:  No Do you have difficulty with spatial ability and orientation?:  No   Do you have difficulty with language?:  No Do you have difficulty with behavior?:  No       Functional Ability/Level of Safety:   Hearing    Hearing difficulties:  No    Hearing Impairment Assessment    Hearing status:  No impairment   Current Activities    Status:  unlimited ADL's, unlimited driving, unlimited IADL's, unlimited social activities   Help needed with the folllowing:    Using the phone:  No Transportation:  No   Shopping:  No Preparing Meals:  No   Doing Housework:  No Doing Laundry:  No   Managing Medications:  No Managing Money:  No   ADL    Fall Risk   Have you fallen in the last 12 months?:  No    Injury History   Polypharmacy:  No Antidepressant Use:  No   Sedative Use:  No Antihypertensive Use:  No   Previous Fall:  No Alcohol Use:  No   Deconditioning:  No Visual Impairment:  No   Cogitive Impairment:  No Mmobility Impairment:  No   Postural Hypotension:  No Urinary Incontinence:  No       Home Safety:   Home Safety Risk Factors   Unfamilar with surroundings:  No Uneven floors:  No   Stairs or handrail saftey risk:  Yes Loose rugs:  No   Household clutter:  No Poor household lighting:  No   No grab bars in bathroom:  No        Advanced Directives:   Advanced Directives    Living Will:  Yes Durable POA for healthcare:   Yes   Advanced directive:  Yes    Patient's End of Life Decisions        Urinary Incontinence:   Do you have urinary incontinence?:  No        Glaucoma:            Provider Screening     Preventative Screening/Counseling:   Cardiovascular Screening/Counseling:   (Labs Q5 years, EKG optional one-time)   General:  Screening Current           Diabetes Screening/Counseling:   (2 tests/year if Pre-Diabetes or 1 test/year if no Diabetes)   General:  Screening Current           Colorectal Cancer Screening/Counseling:   (FOBT Q1 yr; Flex Sig Q4 yrs or Q10 yrs after Screening Colonoscopy; Screening Colonoscpy Q2 yrs High Risk or Q10 yrs Low Risk; Barium Enema Q2 yrs High Risk or Q4 yrs Low Risk)   General:  Screening Current           Prostate Cancer Screening/Counseling:   (Annual)          Breast Cancer Screening/Counseling:   (Baseline Age 28 - 43; Annual Age 36+)   General:  Screening Current          Cervical Cancer Screening/Counseling:   (Annual for High Risk or Childbearing Age with Abnormal Pap in Last 3 yrs; Every 2 all others)   General:  Screening Current           Osteoporosis Screening/Counseling:   (Every 2 Yrs if at risk or more if medically necessary)   General:  Screening Current           AAA Screening/Counseling:   (Once per Lifetime with risk factors)    General:  Screening Not Indicated           Glaucoma Screening/Counseling:   (Annual)   General:  Screening Current          HIV Screening/Counseling:   (Voluntary; Once annually for high risk OR 3 times for Pregnancy at diagnosis of IUP; 3rd trimester; and at Labor         Hepatitis C Screening:             Immunizations:   Influenza (annual): Influenza UTD This Year   Pneumococcal (Once in a Lifetime):  Lifetime Vaccine Completed   Zostavax (Medicare D Coverage, Pt >70 yo):  Zostavax Vaccine UTD   Tdap (Non-Medicare Wellness Visit required): Tdap Vaccine Needed Today       Other Preventative Couseling (Non-Medicare Wellness Visit Required):       Referrals (Non-Medicare Wellness Visit Required):       Medical Equipment/Suppliers:             Review of Systems     Constitutional: no fever and no chills  ENT: no hearing loss, no nasal discharge and no sore throat     Cardiovascular: no chest pain and no palpitations  Respiratory: no shortness of breath and no cough  Gastrointestinal: no abdominal pain, no diarrhea and no constipation  Genitourinary: denies incontinence, but no dysuria  Psychiatric: no anxiety and no depression  Physical Exam     Constitutional   General appearance: No acute distress, well appearing and well nourished  Head and Face   Head and face: Normal     Eyes   Conjunctiva and lids: No swelling, erythema or discharge  Ears, Nose, Mouth, and Throat   Otoscopic examination: Abnormal   The right external canal moderdate cerumen  The left external canal was normal    Oropharynx: Normal with no erythema, edema, exudate or lesions  Neck   Neck: Supple, symmetric, trachea midline, no masses  Pulmonary   Respiratory effort: No increased work of breathing or signs of respiratory distress  Auscultation of lungs: Clear to auscultation  Cardiovascular   Auscultation of heart: Normal rate and rhythm, normal S1 and S2, no murmurs  Examination of extremities for edema and/or varicosities: Normal     Abdomen   Abdomen: Non-tender, no masses  Liver and spleen: No hepatomegaly or splenomegaly  Lymphatic   Palpation of lymph nodes in neck: No lymphadenopathy      Musculoskeletal   Gait and station: Normal     Psychiatric   Orientation to person, place, and time: Normal     Mood and affect: Normal

## 2018-07-31 DIAGNOSIS — Z23 NEED FOR SHINGLES VACCINE: Primary | ICD-10-CM

## 2018-09-13 ENCOUNTER — LAB REQUISITION (OUTPATIENT)
Dept: LAB | Facility: HOSPITAL | Age: 68
End: 2018-09-13
Payer: COMMERCIAL

## 2018-09-13 DIAGNOSIS — K51.90 ULCERATIVE COLITIS WITHOUT COMPLICATIONS (HCC): ICD-10-CM

## 2018-09-13 PROCEDURE — 88305 TISSUE EXAM BY PATHOLOGIST: CPT | Performed by: PATHOLOGY

## 2018-10-01 DIAGNOSIS — Z12.31 ENCOUNTER FOR SCREENING MAMMOGRAM FOR MALIGNANT NEOPLASM OF BREAST: ICD-10-CM

## 2018-10-01 DIAGNOSIS — Z13.820 ENCOUNTER FOR SCREENING FOR OSTEOPOROSIS: ICD-10-CM

## 2018-10-13 ENCOUNTER — IMMUNIZATION (OUTPATIENT)
Dept: INTERNAL MEDICINE CLINIC | Facility: CLINIC | Age: 68
End: 2018-10-13
Payer: COMMERCIAL

## 2018-10-13 DIAGNOSIS — Z23 ENCOUNTER FOR IMMUNIZATION: ICD-10-CM

## 2018-10-13 PROCEDURE — G0008 ADMIN INFLUENZA VIRUS VAC: HCPCS

## 2018-10-13 PROCEDURE — 90662 IIV NO PRSV INCREASED AG IM: CPT

## 2018-10-17 ENCOUNTER — TELEPHONE (OUTPATIENT)
Dept: OBGYN CLINIC | Facility: CLINIC | Age: 68
End: 2018-10-17

## 2018-10-17 NOTE — TELEPHONE ENCOUNTER
----- Message from Claire Bhat MD sent at 10/12/2018  8:32 AM EDT -----  DEXA scan shows osteopenia    Stressed the importance of diet, exercise and calcium with vitamin-D supplementation    Thanks

## 2018-11-29 PROBLEM — C73 PAPILLARY THYROID CARCINOMA (HCC): Status: ACTIVE | Noted: 2018-11-29

## 2018-11-30 ENCOUNTER — OFFICE VISIT (OUTPATIENT)
Dept: SURGICAL ONCOLOGY | Facility: CLINIC | Age: 68
End: 2018-11-30
Payer: COMMERCIAL

## 2018-11-30 VITALS
HEART RATE: 68 BPM | DIASTOLIC BLOOD PRESSURE: 90 MMHG | SYSTOLIC BLOOD PRESSURE: 130 MMHG | RESPIRATION RATE: 16 BRPM | HEIGHT: 66 IN | TEMPERATURE: 97.9 F | WEIGHT: 127 LBS | BODY MASS INDEX: 20.41 KG/M2

## 2018-11-30 DIAGNOSIS — C73 PAPILLARY THYROID CARCINOMA (HCC): Primary | ICD-10-CM

## 2018-11-30 PROCEDURE — 99213 OFFICE O/P EST LOW 20 MIN: CPT | Performed by: SURGERY

## 2018-11-30 PROCEDURE — 4040F PNEUMOC VAC/ADMIN/RCVD: CPT | Performed by: SURGERY

## 2018-11-30 NOTE — PROGRESS NOTES
Surgical Oncology Follow Up       [address] [facility] Shoaib Lombardi  [address] [facility] [address] [facility] [address]  [address] [facility]  1950  [id number]  [address] [facility]  [address] [facility] [address] [facility] [address] [phone]    Diagnoses and all orders for this visit:    Papillary thyroid carcinoma [facility])        Chief Complaint   Patient presents with    Follow-up     Pt is here for yearly thyroid ca f/u  Return if symptoms worsen or fail to improve  No history exists  Diagnosis and Staging: [id number] papillary carcinoma of the thyroid July 2010   Treatment History: Total thyroidectomy July 2010  Radioactive iodine  TSH suppression   Current Therapy: TSH suppression   Disease Status: ROSANNA     History of Present Illness:  Patient returns in follow-up of her thyroid carcinoma  She is doing well at this time with no complaints  No dysphagia or hoarseness  Her thyroglobulin and thyroglobulin antibodies in May of this year were undetectable  The the TSH is still slightly suppressed  This is being managed by Endocrinology  Review of Systems  Complete ROS Surg Onc:   Complete ROS Surg Onc:   Constitutional: The patient denies new or recent history of general fatigue, no recent weight loss, no change in appetite  Eyes: No complaints of visual problems, no scleral icterus  ENT: no complaints of ear pain, no hoarseness, no difficulty swallowing,  no tinnitus and no new masses in head, oral cavity, or neck  Cardiovascular: No complaints of chest pain, no palpitations, no ankle edema  Respiratory: No complaints of shortness of breath, no cough  Gastrointestinal: No complaints of jaundice, no bloody stools, no pale stools     Genitourinary: No complaints of dysuria, no hematuria, no nocturia, no frequent urination, no urethral discharge  Musculoskeletal: No complaints of weakness, paralysis, joint stiffness or arthralgias  Integumentary: No complaints of rash, no new lesions  Neurological: No complaints of convulsions, no seizures, no dizziness  Hematologic/Lymphatic: No complaints of easy bruising  Endocrine:  No hot or cold intolerance  No polydipsia, polyphagia, or polyuria  Allergy/immunology:  No environmental allergies  No food allergies  Not immunocompromised  Skin:  No pallor or rash  No wound  Patient Active Problem List   Diagnosis    Medicare annual wellness visit, subsequent    Hyperlipidemia    Impaired fasting glucose    Osteopenia    Postoperative hypothyroidism    Papillary thyroid carcinoma ([facility] )     Past Medical History:   Diagnosis Date    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[facility] )     last assessed 11/17/17    Seborrheic keratosis     last assessed 5/1/15, resolved 11/2/15    Stool guaiac positive     last assessed 11/19/14, resolved 11/2/15    Ulcerative colitis without complications ([facility] )     Vaginal atrophy     last assessed 11/19/14, resolved 11/2/15     Past Surgical History:   Procedure Laterality Date    THYROIDECTOMY      last assessed 6/13/16    TOOTH EXTRACTION      last assessed 6/13/16     Family History   Problem Relation Age of Onset    COPD Mother     Seizures Mother     Transient ischemic attack Mother         & cerebral infarction    Hypertension Father     Heart murmur Father     Heart disease Father         pacemaker placement    Diabetes Maternal Grandmother     Cancer Family      Social History     Social History    Marital status:      Spouse name: N/A    Number of children: N/A    Years of education: N/A     Occupational History    Not on file       Social History Main Topics    Smoking status: Never Smoker    Smokeless tobacco: Never Used    Alcohol use Yes      Comment: Minimum consumption     Drug use: No    Sexual activity: Not on file Other Topics Concern    Not on file     Social History Narrative    Feels safe at home     Home environment domestic violence: denied        Current Outpatient Prescriptions:     aspirin (ASPIRIN ADULT LOW STRENGTH) 81 mg EC tablet, Take 1 tablet by mouth daily, Disp: , Rfl:     Calcium Carbonate-Vit D-Min (CALTRATE 600+D PLUS MINERALS) 600-800 MG-UNIT CHEW, Chew 1 tablet daily, Disp: , Rfl:     Multiple Vitamins-Minerals (CENTRUM SILVER ULTRA WOMENS PO), Take 1 tablet by mouth daily, Disp: , Rfl:     SYNTHROID 75 MCG tablet, Take 1 tablet (75 mcg total) by mouth daily in the early morning, Disp: 90 tablet, Rfl: 2  Allergies   Allergen Reactions    Mesalamine Hives and Rash     [facility] - [id number]: Allergy is to Suppository     Vitals:    11/30/18 0925   BP: 130/90   Pulse: 68   Resp: 16   Temp: 97 9 °F (36 6 °C)       Physical Exam  Constitutional: General appearance: The Patient is well-developed and well-nourished who appears the stated age in no acute distress  Patient is pleasant and talkative  HEENT:  Normocephalic  Sclerae are anicteric  Mucous membranes are moist  Neck is supple without adenopathy  No JVD  I do not appreciate any new or recurrent neck masses  Chest: The lungs are clear to auscultation  Cardiac: Heart is regular rate  Abdomen: Abdomen is soft, non-tender, non-distended and without masses  Extremities: There is no clubbing or cyanosis  There is no edema  Symmetric  Neuro: Grossly nonfocal  Gait is normal      Lymphatic: No evidence of cervical adenopathy bilaterally  Skin: Warm, anicteric  Psych:  Patient is pleasant and talkative  Breasts:        Pathology:      Labs:      Imaging  No results found  I reviewed the above laboratory and imaging data  Discussion/Summary: 42-year-old female status post total thyroidectomy for a T1N1M0 papillary carcinoma of the thyroid  She is clinically ROSANNA at 9 years  She is doing well   She has her labs monitored by Dr Diaz Husbands  Her thyroglobulin and antibodies are undetectable  Her risk of recurrence is low  At this time, since she is following up with Dr Diaz Husbands yearly, I will see her again if there is any evidence of recurrence  She is agreeable to this  All her questions were answered

## 2018-11-30 NOTE — LETTER
November 30, 2018     Kash García MD  [address]  [address] [place] [address]    Patient: Adrienne Diaz   YOB: 1950   Date of Visit: 11/30/2018       Dear Dr Hugo Arevalo: Thank you for referring Lowell Claude to me for evaluation  Below are my notes for this consultation  If you have questions, please do not hesitate to call me  I look forward to following your patient along with you  Sincerely,        Maria Del Rosario Espinoza MD        CC: MD Maria Del Rosario Thomas MD  11/30/2018  9:59 AM  Sign at close encounter               Surgical Oncology Follow Up       [facility]  [address] [address]  [address] [facility]  1950  [id number]  [address] [place] [address] [facility] Landen Dudley  [address]  [facility] [address] [facility] [address] [facility]  [phone]    Diagnoses and all orders for this visit:    Papillary thyroid carcinoma [facility])        Chief Complaint   Patient presents with    Follow-up     Pt is here for yearly thyroid ca f/u  Return if symptoms worsen or fail to improve  No history exists  Diagnosis and Staging: [id number] papillary carcinoma of the thyroid July 2010   Treatment History: Total thyroidectomy July 2010  Radioactive iodine  TSH suppression   Current Therapy: TSH suppression   Disease Status: ROSANNA     History of Present Illness:  Patient returns in follow-up of her thyroid carcinoma  She is doing well at this time with no complaints  No dysphagia or hoarseness  Her thyroglobulin and thyroglobulin antibodies in May of this year were undetectable  The the TSH is still slightly suppressed  This is being managed by Endocrinology      Review of Systems  Complete ROS Surg Onc:   Complete ROS Surg Onc:   Constitutional: The patient denies new or recent history of general fatigue, no recent weight loss, no change in appetite  Eyes: No complaints of visual problems, no scleral icterus  ENT: no complaints of ear pain, no hoarseness, no difficulty swallowing,  no tinnitus and no new masses in head, oral cavity, or neck  Cardiovascular: No complaints of chest pain, no palpitations, no ankle edema  Respiratory: No complaints of shortness of breath, no cough  Gastrointestinal: No complaints of jaundice, no bloody stools, no pale stools  Genitourinary: No complaints of dysuria, no hematuria, no nocturia, no frequent urination, no urethral discharge  Musculoskeletal: No complaints of weakness, paralysis, joint stiffness or arthralgias  Integumentary: No complaints of rash, no new lesions  Neurological: No complaints of convulsions, no seizures, no dizziness  Hematologic/Lymphatic: No complaints of easy bruising  Endocrine:  No hot or cold intolerance  No polydipsia, polyphagia, or polyuria  Allergy/immunology:  No environmental allergies  No food allergies  Not immunocompromised  Skin:  No pallor or rash  No wound          Patient Active Problem List   Diagnosis    Medicare annual wellness visit, subsequent    Hyperlipidemia    Impaired fasting glucose    Osteopenia    Postoperative hypothyroidism    Papillary thyroid carcinoma ([facility] )     Past Medical History:   Diagnosis Date    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[facility] )     last assessed 11/17/17    Seborrheic keratosis     last assessed 5/1/15, resolved 11/2/15    Stool guaiac positive     last assessed 11/19/14, resolved 11/2/15    Ulcerative colitis without complications ([address] )     Vaginal atrophy     last assessed 11/19/14, resolved 11/2/15     Past Surgical History:   Procedure Laterality Date    THYROIDECTOMY      last assessed 6/13/16    TOOTH EXTRACTION      last assessed 6/13/16     Family History   Problem Relation Age of Onset    COPD Mother     Seizures Mother     Transient ischemic attack Mother         & cerebral infarction   [facility] Hypertension Father     Heart murmur Father     Heart disease Father         pacemaker placement    Diabetes Maternal Grandmother     Cancer Family      Social History     Social History    Marital status:      Spouse name: N/A    Number of children: N/A    Years of education: N/A     Occupational History    Not on file  Social History Main Topics    Smoking status: Never Smoker    Smokeless tobacco: Never Used    Alcohol use Yes      Comment: Minimum consumption     Drug use: No    Sexual activity: Not on file     Other Topics Concern    Not on file     Social History Narrative    Feels safe at home     Home environment domestic violence: denied        Current Outpatient Prescriptions:     aspirin (ASPIRIN ADULT LOW STRENGTH) 81 mg EC tablet, Take 1 tablet by mouth daily, Disp: , Rfl:     Calcium Carbonate-Vit D-Min (CALTRATE 600+D PLUS MINERALS) 600-800 MG-UNIT CHEW, Chew 1 tablet daily, Disp: , Rfl:     Multiple Vitamins-Minerals (CENTRUM SILVER ULTRA WOMENS PO), Take 1 tablet by mouth daily, Disp: , Rfl:     SYNTHROID 75 MCG tablet, Take 1 tablet (75 mcg total) by mouth daily in the early morning, Disp: 90 tablet, Rfl: 2  Allergies   Allergen Reactions    Mesalamine Hives and Rash     Annotation - [id number]: Allergy is to Suppository     Vitals:    11/30/18 0925   BP: 130/90   Pulse: 68   Resp: 16   Temp: 97 9 °F (36 6 °C)       Physical Exam  Constitutional: General appearance: The Patient is well-developed and well-nourished who appears the stated age in no acute distress  Patient is pleasant and talkative  HEENT:  Normocephalic  Sclerae are anicteric  Mucous membranes are moist  Neck is supple without adenopathy  No JVD  I do not appreciate any new or recurrent neck masses  Chest: The lungs are clear to auscultation  Cardiac: Heart is regular rate  Abdomen: Abdomen is soft, non-tender, non-distended and without masses  Extremities:  There is no clubbing or cyanosis  There is no edema  Symmetric  Neuro: Grossly nonfocal  Gait is normal      Lymphatic: No evidence of cervical adenopathy bilaterally  Skin: Warm, anicteric  Psych:  Patient is pleasant and talkative  Breasts:        Pathology:      Labs:      Imaging  No results found  I reviewed the above laboratory and imaging data  Discussion/Summary: 13-year-old female status post total thyroidectomy for a T1N1M0 papillary carcinoma of the thyroid  She is clinically ROSANNA at 9 years  She is doing well  She has her labs monitored by Dr Rani Coreas  Her thyroglobulin and antibodies are undetectable  Her risk of recurrence is low  At this time, since she is following up with Dr Rani Coreas yearly, I will see her again if there is any evidence of recurrence  She is agreeable to this  All her questions were answered

## 2018-12-03 ENCOUNTER — ANNUAL EXAM (OUTPATIENT)
Dept: OBGYN CLINIC | Facility: CLINIC | Age: 68
End: 2018-12-03
Payer: COMMERCIAL

## 2018-12-03 VITALS — SYSTOLIC BLOOD PRESSURE: 128 MMHG | WEIGHT: 126.8 LBS | DIASTOLIC BLOOD PRESSURE: 76 MMHG | BODY MASS INDEX: 20.47 KG/M2

## 2018-12-03 DIAGNOSIS — Z01.419 ENCOUNTER FOR GYNECOLOGICAL EXAMINATION: ICD-10-CM

## 2018-12-03 DIAGNOSIS — Z12.39 BREAST CANCER SCREENING: Primary | ICD-10-CM

## 2018-12-03 PROCEDURE — G0101 CA SCREEN;PELVIC/BREAST EXAM: HCPCS | Performed by: OBSTETRICS & GYNECOLOGY

## 2018-12-03 RX ORDER — ZOSTER VACCINE RECOMBINANT, ADJUVANTED 50 MCG/0.5
KIT INTRAMUSCULAR
COMMUNITY
Start: 2018-11-11 | End: 2019-04-18 | Stop reason: ALTCHOICE

## 2018-12-03 RX ORDER — LEVOTHYROXINE SODIUM 0.07 MG/1
75 TABLET ORAL DAILY
COMMUNITY
End: 2019-04-18 | Stop reason: ALTCHOICE

## 2018-12-03 NOTE — PROGRESS NOTES
Assessment/Plan:    No problem-specific Assessment & Plan notes found for this encounter  Normal gynecological physical examination  Self-breast examination stressed  Mammogram ordered  Discussed regular exercise, healthy diet, importance of vitamin D and calcium supplements  Discussed importance of sun block use during periods of prolonged sun exposure  Patient will be seen in 1 year for routine gynecologic and medical examination  Patient will call office for any problems, concerns, or issues which may arise during the interim  Diagnoses and all orders for this visit:    Breast cancer screening  -     Mammo screening bilateral w cad; Future    Other orders  -     SHINGRIX 50 MCG/0 5ML SUSR;   -     levothyroxine 75 mcg tablet; Take 75 mcg by mouth daily        Subjective:      Patient ID: Gay Morel is a 80 y o  female  Patient is a 56-year-old female who presents today for her annual gynecologic and medical examination    Patient denies any vaginal bleeding    She also denies any significant vasomotor symptoms    Patient reports normal bowel and bladder habits    She denies any significant pelvic or abdominal pain    She is followed medically status post thyroid cancer number of years ago for which she is been discharged from surgical Oncology and needs only follow-up care for thyroid replacement    She does regular self-breast examinations and is current with mammography screening    She reports no new medical problems at this time              The following portions of the patient's history were reviewed and updated as appropriate: allergies, current medications, past family history, past medical history, past social history, past surgical history and problem list     Review of Systems   Constitutional: Negative  HENT: Negative  Eyes: Negative  Respiratory: Negative  Cardiovascular: Negative  Gastrointestinal: Negative  Endocrine: Negative           Followed for thyroid   Genitourinary: Negative  Musculoskeletal: Negative  Skin: Negative  Neurological: Negative  Psychiatric/Behavioral: Negative  Objective: There were no vitals taken for this visit  Physical Exam   Constitutional: She appears well-developed  HENT:   Head: Normocephalic  Eyes: Pupils are equal, round, and reactive to light  Neck: Normal range of motion  Neck supple  Cardiovascular: Normal rate and regular rhythm  Pulmonary/Chest: Effort normal and breath sounds normal  Right breast exhibits no inverted nipple, no mass, no nipple discharge, no skin change and no tenderness  Left breast exhibits no inverted nipple, no mass, no nipple discharge, no skin change and no tenderness  Breasts are symmetrical    Abdominal: Soft  Normal appearance and bowel sounds are normal    Genitourinary: Rectum normal, vagina normal and uterus normal  Rectal exam shows guaiac negative stool  Pelvic exam was performed with patient supine  Right adnexum displays no mass, no tenderness and no fullness  Left adnexum displays no mass, no tenderness and no fullness  No vaginal discharge found  Lymphadenopathy:     She has no cervical adenopathy  She has no axillary adenopathy  Right: No inguinal and no supraclavicular adenopathy present  Left: No inguinal and no supraclavicular adenopathy present  Neurological: She is alert  Skin: Skin is intact  No rash noted  Psychiatric: She has a normal mood and affect   Her speech is normal and behavior is normal  Judgment and thought content normal  Cognition and memory are normal

## 2019-04-24 ENCOUNTER — APPOINTMENT (OUTPATIENT)
Dept: LAB | Facility: CLINIC | Age: 69
End: 2019-04-24
Payer: COMMERCIAL

## 2019-04-24 ENCOUNTER — OFFICE VISIT (OUTPATIENT)
Dept: ENDOCRINOLOGY | Facility: CLINIC | Age: 69
End: 2019-04-24
Payer: COMMERCIAL

## 2019-04-24 VITALS
DIASTOLIC BLOOD PRESSURE: 78 MMHG | HEIGHT: 66 IN | BODY MASS INDEX: 21.05 KG/M2 | SYSTOLIC BLOOD PRESSURE: 130 MMHG | HEART RATE: 98 BPM | WEIGHT: 131 LBS

## 2019-04-24 DIAGNOSIS — E89.0 POSTSURGICAL HYPOTHYROIDISM: ICD-10-CM

## 2019-04-24 DIAGNOSIS — E89.0 POSTOPERATIVE HYPOTHYROIDISM: Primary | ICD-10-CM

## 2019-04-24 DIAGNOSIS — C73 PAPILLARY THYROID CARCINOMA (HCC): ICD-10-CM

## 2019-04-24 LAB
T4 FREE SERPL-MCNC: 1.53 NG/DL (ref 0.76–1.46)
TSH SERPL DL<=0.05 MIU/L-ACNC: 0.08 UIU/ML (ref 0.36–3.74)

## 2019-04-24 PROCEDURE — 84443 ASSAY THYROID STIM HORMONE: CPT | Performed by: NURSE PRACTITIONER

## 2019-04-24 PROCEDURE — 86800 THYROGLOBULIN ANTIBODY: CPT | Performed by: NURSE PRACTITIONER

## 2019-04-24 PROCEDURE — 84439 ASSAY OF FREE THYROXINE: CPT | Performed by: NURSE PRACTITIONER

## 2019-04-24 PROCEDURE — 99214 OFFICE O/P EST MOD 30 MIN: CPT | Performed by: NURSE PRACTITIONER

## 2019-04-24 PROCEDURE — 84432 ASSAY OF THYROGLOBULIN: CPT | Performed by: NURSE PRACTITIONER

## 2019-04-24 PROCEDURE — 36415 COLL VENOUS BLD VENIPUNCTURE: CPT | Performed by: NURSE PRACTITIONER

## 2019-04-24 RX ORDER — LEVOTHYROXINE SODIUM 75 MCG
75 TABLET ORAL
Qty: 90 TABLET | Refills: 3 | Status: SHIPPED | OUTPATIENT
Start: 2019-04-24 | End: 2019-06-18 | Stop reason: DRUGHIGH

## 2019-04-25 LAB
THYROGLOB AB SERPL-ACNC: <1 IU/ML (ref 0–0.9)
THYROGLOB SERPL-MCNC: <0.1 NG/ML (ref 1.5–38.5)

## 2019-04-26 ENCOUNTER — TELEPHONE (OUTPATIENT)
Dept: ENDOCRINOLOGY | Facility: CLINIC | Age: 69
End: 2019-04-26

## 2019-04-26 DIAGNOSIS — E89.0 POSTOPERATIVE HYPOTHYROIDISM: Primary | ICD-10-CM

## 2019-05-02 ENCOUNTER — TELEPHONE (OUTPATIENT)
Dept: ENDOCRINOLOGY | Facility: CLINIC | Age: 69
End: 2019-05-02

## 2019-05-03 ENCOUNTER — TELEPHONE (OUTPATIENT)
Dept: ENDOCRINOLOGY | Facility: CLINIC | Age: 69
End: 2019-05-03

## 2019-05-20 ENCOUNTER — OFFICE VISIT (OUTPATIENT)
Dept: OBGYN CLINIC | Facility: CLINIC | Age: 69
End: 2019-05-20
Payer: COMMERCIAL

## 2019-05-20 VITALS — DIASTOLIC BLOOD PRESSURE: 72 MMHG | SYSTOLIC BLOOD PRESSURE: 128 MMHG | WEIGHT: 127 LBS | BODY MASS INDEX: 20.5 KG/M2

## 2019-05-20 DIAGNOSIS — L73.9 FOLLICULITIS: Primary | ICD-10-CM

## 2019-05-20 PROCEDURE — 99214 OFFICE O/P EST MOD 30 MIN: CPT | Performed by: OBSTETRICS & GYNECOLOGY

## 2019-05-21 RX ORDER — CEPHALEXIN 500 MG/1
500 CAPSULE ORAL 2 TIMES DAILY
Qty: 14 CAPSULE | Refills: 0 | Status: SHIPPED | OUTPATIENT
Start: 2019-05-21 | End: 2019-05-28

## 2019-05-28 ENCOUNTER — OFFICE VISIT (OUTPATIENT)
Dept: OBGYN CLINIC | Facility: CLINIC | Age: 69
End: 2019-05-28
Payer: COMMERCIAL

## 2019-05-28 VITALS
BODY MASS INDEX: 20.41 KG/M2 | DIASTOLIC BLOOD PRESSURE: 86 MMHG | WEIGHT: 127 LBS | SYSTOLIC BLOOD PRESSURE: 154 MMHG | HEIGHT: 66 IN

## 2019-05-28 DIAGNOSIS — L73.9 FOLLICULITIS: Primary | ICD-10-CM

## 2019-05-28 PROCEDURE — 99213 OFFICE O/P EST LOW 20 MIN: CPT | Performed by: OBSTETRICS & GYNECOLOGY

## 2019-06-02 LAB
25(OH)D3 SERPL-MCNC: 46 NG/ML (ref 30–100)
ALBUMIN SERPL-MCNC: 4.5 G/DL (ref 3.6–5.1)
ALBUMIN/GLOB SERPL: 1.7 (CALC) (ref 1–2.5)
ALP SERPL-CCNC: 56 U/L (ref 33–130)
ALT SERPL-CCNC: 13 U/L (ref 6–29)
AST SERPL-CCNC: 24 U/L (ref 10–35)
BASOPHILS # BLD AUTO: 20 CELLS/UL (ref 0–200)
BASOPHILS NFR BLD AUTO: 0.5 %
BILIRUB SERPL-MCNC: 0.9 MG/DL (ref 0.2–1.2)
BUN SERPL-MCNC: 12 MG/DL (ref 7–25)
BUN/CREAT SERPL: NORMAL (CALC) (ref 6–22)
CALCIUM SERPL-MCNC: 9.6 MG/DL (ref 8.6–10.4)
CHLORIDE SERPL-SCNC: 105 MMOL/L (ref 98–110)
CHOLEST SERPL-MCNC: 213 MG/DL
CHOLEST/HDLC SERPL: 3 (CALC)
CO2 SERPL-SCNC: 28 MMOL/L (ref 20–32)
CREAT SERPL-MCNC: 0.76 MG/DL (ref 0.5–0.99)
EOSINOPHIL # BLD AUTO: 72 CELLS/UL (ref 15–500)
EOSINOPHIL NFR BLD AUTO: 1.8 %
ERYTHROCYTE [DISTWIDTH] IN BLOOD BY AUTOMATED COUNT: 12.8 % (ref 11–15)
GLOBULIN SER CALC-MCNC: 2.6 G/DL (CALC) (ref 1.9–3.7)
GLUCOSE SERPL-MCNC: 92 MG/DL (ref 65–99)
HCT VFR BLD AUTO: 37.8 % (ref 35–45)
HDLC SERPL-MCNC: 70 MG/DL
HGB BLD-MCNC: 12.6 G/DL (ref 11.7–15.5)
LDLC SERPL CALC-MCNC: 120 MG/DL (CALC)
LYMPHOCYTES # BLD AUTO: 1424 CELLS/UL (ref 850–3900)
LYMPHOCYTES NFR BLD AUTO: 35.6 %
MCH RBC QN AUTO: 30.3 PG (ref 27–33)
MCHC RBC AUTO-ENTMCNC: 33.3 G/DL (ref 32–36)
MCV RBC AUTO: 90.9 FL (ref 80–100)
MONOCYTES # BLD AUTO: 480 CELLS/UL (ref 200–950)
MONOCYTES NFR BLD AUTO: 12 %
NEUTROPHILS # BLD AUTO: 2004 CELLS/UL (ref 1500–7800)
NEUTROPHILS NFR BLD AUTO: 50.1 %
NONHDLC SERPL-MCNC: 143 MG/DL (CALC)
PLATELET # BLD AUTO: 254 THOUSAND/UL (ref 140–400)
PMV BLD REES-ECKER: 9.8 FL (ref 7.5–12.5)
POTASSIUM SERPL-SCNC: 4.1 MMOL/L (ref 3.5–5.3)
PROT SERPL-MCNC: 7.1 G/DL (ref 6.1–8.1)
RBC # BLD AUTO: 4.16 MILLION/UL (ref 3.8–5.1)
SL AMB EGFR AFRICAN AMERICAN: 94 ML/MIN/1.73M2
SL AMB EGFR NON AFRICAN AMERICAN: 81 ML/MIN/1.73M2
SODIUM SERPL-SCNC: 143 MMOL/L (ref 135–146)
TRIGL SERPL-MCNC: 118 MG/DL
WBC # BLD AUTO: 4 THOUSAND/UL (ref 3.8–10.8)

## 2019-06-18 DIAGNOSIS — E89.0 POSTOPERATIVE HYPOTHYROIDISM: Primary | ICD-10-CM

## 2019-06-18 LAB
T4 FREE SERPL-MCNC: 1.5 NG/DL (ref 0.8–1.8)
TSH SERPL-ACNC: 0.06 MIU/L (ref 0.4–4.5)

## 2019-06-18 RX ORDER — LEVOTHYROXINE SODIUM 50 MCG
50 TABLET ORAL DAILY
COMMUNITY
End: 2019-06-18 | Stop reason: DRUGHIGH

## 2019-06-19 RX ORDER — LEVOTHYROXINE SODIUM 50 MCG
50 TABLET ORAL DAILY
Qty: 30 TABLET | Refills: 1 | Status: SHIPPED | OUTPATIENT
Start: 2019-06-19 | End: 2019-08-01 | Stop reason: SDUPTHER

## 2019-06-25 ENCOUNTER — OFFICE VISIT (OUTPATIENT)
Dept: OBGYN CLINIC | Facility: CLINIC | Age: 69
End: 2019-06-25
Payer: COMMERCIAL

## 2019-06-25 VITALS
SYSTOLIC BLOOD PRESSURE: 148 MMHG | BODY MASS INDEX: 20.09 KG/M2 | HEIGHT: 66 IN | DIASTOLIC BLOOD PRESSURE: 76 MMHG | WEIGHT: 125 LBS

## 2019-06-25 DIAGNOSIS — L73.9 FOLLICULITIS: ICD-10-CM

## 2019-06-25 DIAGNOSIS — N90.89 VULVAR IRRITATION: Primary | ICD-10-CM

## 2019-06-25 PROCEDURE — 99213 OFFICE O/P EST LOW 20 MIN: CPT | Performed by: OBSTETRICS & GYNECOLOGY

## 2019-07-01 ENCOUNTER — OFFICE VISIT (OUTPATIENT)
Dept: INTERNAL MEDICINE CLINIC | Facility: CLINIC | Age: 69
End: 2019-07-01
Payer: COMMERCIAL

## 2019-07-01 VITALS
HEIGHT: 66 IN | BODY MASS INDEX: 20.28 KG/M2 | HEART RATE: 72 BPM | SYSTOLIC BLOOD PRESSURE: 148 MMHG | OXYGEN SATURATION: 98 % | DIASTOLIC BLOOD PRESSURE: 80 MMHG | WEIGHT: 126.2 LBS

## 2019-07-01 DIAGNOSIS — M85.80 OSTEOPENIA, UNSPECIFIED LOCATION: ICD-10-CM

## 2019-07-01 DIAGNOSIS — H61.21 IMPACTED CERUMEN, RIGHT EAR: ICD-10-CM

## 2019-07-01 DIAGNOSIS — R03.0 ELEVATED BLOOD PRESSURE READING IN OFFICE WITH WHITE COAT SYNDROME, WITHOUT DIAGNOSIS OF HYPERTENSION: ICD-10-CM

## 2019-07-01 DIAGNOSIS — E89.0 POSTOPERATIVE HYPOTHYROIDISM: ICD-10-CM

## 2019-07-01 DIAGNOSIS — C73 PAPILLARY THYROID CARCINOMA (HCC): ICD-10-CM

## 2019-07-01 DIAGNOSIS — Z00.00 MEDICARE ANNUAL WELLNESS VISIT, SUBSEQUENT: Primary | ICD-10-CM

## 2019-07-01 DIAGNOSIS — E78.2 MIXED HYPERLIPIDEMIA: ICD-10-CM

## 2019-07-01 PROCEDURE — G0439 PPPS, SUBSEQ VISIT: HCPCS | Performed by: INTERNAL MEDICINE

## 2019-07-01 PROCEDURE — 1036F TOBACCO NON-USER: CPT | Performed by: INTERNAL MEDICINE

## 2019-07-01 PROCEDURE — 1170F FXNL STATUS ASSESSED: CPT | Performed by: INTERNAL MEDICINE

## 2019-07-01 PROCEDURE — 69210 REMOVE IMPACTED EAR WAX UNI: CPT | Performed by: INTERNAL MEDICINE

## 2019-07-01 NOTE — PROGRESS NOTES
Assessment and Plan:     Problem List Items Addressed This Visit        Endocrine    Postoperative hypothyroidism     Managed by endocrine         Papillary thyroid carcinoma ([address] )     Continues to see surgical oncology yearly         Relevant Orders    CBC and differential    Comprehensive metabolic panel       Musculoskeletal and Integument    Osteopenia     Ca, D, weight bearing exercises         Relevant Orders    Vitamin D 25 hydroxy       Other    Medicare annual wellness visit, subsequent - Primary    Hyperlipidemia     8 y CVD risk 7 8%  Discussed modification of risk factors especially BP control  She is known to have white coat HTN and I have advised her to resume checking her BP at home         Relevant Orders    CBC and differential    Comprehensive metabolic panel    Lipid panel    Elevated blood pressure reading in office with white coat syndrome, without diagnosis of hypertension     Resume checking BP at home              History of Present Illness:     Patient presents for Welcome to Medicare visit  Patient Care Team:  Ciro Dove MD as PCP - Irish Calles MD as PCP - Endocrinology (Endocrinology)  Sibyl Osgood, MD Algernon Quinton, MD Tally Leventhal, MD Franciso Navy, MD Curtiss Kaska, MD     Review of Systems:     Review of Systems   Constitutional: Negative for fatigue, fever and unexpected weight change  HENT: Negative for ear pain, hearing loss, sinus pressure, sinus pain and sore throat  Respiratory: Negative for cough, shortness of breath and wheezing  Cardiovascular: Negative for chest pain, palpitations and leg swelling  Gastrointestinal: Negative for abdominal pain, blood in stool, bowel incontinence, constipation, diarrhea, nausea and vomiting  Genitourinary: Negative for difficulty urinating, dysuria, hematuria and vaginal bleeding  Musculoskeletal: Negative for arthralgias and myalgias  Neurological: Negative for dizziness and headaches  Psychiatric/Behavioral: The patient is not nervous/anxious           Problem List:     Patient Active Problem List   Diagnosis    Medicare annual wellness visit, subsequent    Hyperlipidemia    Impaired fasting glucose    Osteopenia    Postoperative hypothyroidism    [facility] thyroid carcinoma ([facility] )    Elevated blood pressure reading in office with white coat syndrome, without diagnosis of hypertension      Past Medical and Surgical History:     Past Medical History:   Diagnosis Date    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[facility] )     last assessed 11/17/17    Seborrheic keratosis     last assessed 5/1/15, resolved 11/2/15    Stool guaiac positive     last assessed 11/19/14, resolved 11/2/15    Ulcerative colitis without complications ([facility] )     Vaginal atrophy     last assessed 11/19/14, resolved 11/2/15     Past Surgical History:   Procedure Laterality Date    THYROIDECTOMY      last assessed 6/13/16    TOOTH EXTRACTION      last assessed 6/13/16      Family History:     Family History   Problem Relation Age of Onset    COPD Mother     Seizures Mother     Transient ischemic attack Mother         & cerebral infarction    Hypertension Father     Heart murmur Father     Heart disease Father         pacemaker placement    Breast cancer Sister 58        Stage 1 at diagnosis    Diabetes Maternal Grandmother     Cancer Family       Social History:     Social History     Tobacco Use   Smoking Status Never Smoker   Smokeless Tobacco Never Used     Social History     Substance and Sexual Activity   Alcohol Use Yes    Comment: Minimum consumption      Social History     Substance and Sexual Activity   Drug Use No      Medications and Allergies:     Current Outpatient Medications   Medication Sig Dispense Refill    aspirin (ASPIRIN ADULT LOW STRENGTH) 81 mg EC tablet Take 1 tablet by mouth daily      Multiple Vitamins-Minerals (CENTRUM SILVER ULTRA WOMENS PO) Take 1 tablet by mouth daily      SYNTHROID 50 MCG tablet Take 1 tablet (50 mcg total) by mouth daily 30 tablet 1     No current facility-administered medications for this visit  Allergies   Allergen Reactions    Mesalamine Hives and Rash     [facility] - [id number]: Allergy is to Suppository      Immunizations:     Immunization History   Administered Date(s) Administered    DTaP 5 05/23/2008    Influenza Split High Dose Preservative Free IM 10/08/2016, 10/07/2017    Influenza, high dose seasonal 0 5 mL 10/13/2018    Pneumococcal Conjugate 13-Valent 09/16/2016    Pneumococcal Polysaccharide PPV23 08/20/2010, 09/19/2017    Td (adult), adsorbed 10/01/1998, 05/23/2008    Tdap 06/29/2018    Zoster 06/22/2012    Zoster Vaccine Recombinant 11/11/2018, 03/01/2019      Medicare Screening Tests and Risk Assessments:     Jermaine Snow is here for her Subsequent Wellness visit  Health Risk Assessment:  Patient rates overall health as good  Patient feels that their physical health rating is Same  Eyesight was rated as Same  Hearing was rated as Same  Patient feels that their emotional and mental health rating is Same  Patient states that she has experienced no weight loss or gain in last 6 months  Emotional/Mental Health:  Patient has not been feeling nervous/anxious  PHQ-9 Depression Screening:    Frequency of the following problems over the past two weeks:      1  Little interest or pleasure in doing things: 0 - not at all      2  Feeling down, depressed, or hopeless: 0 - not at all  PHQ-2 Score: 0          Broken Bones/Falls: Fall Risk Assessment:    In the past year, patient has experienced: No history of falling in past year          Bladder/Bowel:  Patient has not leaked urine accidently in the last six months  Patient reports no loss of bowel control  Immunizations:  Patient has had a flu vaccination within the last year  Patient has received a pneumonia shot  Patient has received a shingles shot    Patient has received tetanus/diphtheria shot  Home Safety:  Patient does not have trouble with stairs inside or outside of their home  Patient currently reports that there are no safety hazards present in home, working smoke alarms, working carbon monoxide detectors  Preventative Screenings:   Breast cancer screening performed, colon cancer screen completed, cholesterol screen completed, 6/1/2019  glaucoma eye exam completed, (Additional Comments: Patient see's Dr Dior Muhammad for yearly eye exams )    Nutrition:  Current diet: Regular with servings of the following:    Medications:  Patient is currently taking over-the-counter supplements  List of OTC medications includes: aspirin 81 mg, centrum silver, caltrate 600-D3 plus minerals  Patient is able to manage medications  Lifestyle Choices:  Patient reports no tobacco use  Patient reports alcohol use  Alcohol use per week: rare use  Patient drives a vehicle  Patient wears seat belt  Current level of exercise of physical activity described by patient as: walks everyday for 20 minutes   Activities of Daily Living:  Can get out of bed by his or her self, able to dress self, able to make own meals, able to do own shopping, able to bathe self, can do own laundry/housekeeping, can manage own money, pay bills and track expenses    Previous Hospitalizations:  No hospitalization or ED visit in past 12 months        Advanced Directives:  Patient has decided on a power of   Patient has spoken to designated power of   Patient has completed advanced directive          Preventative Screening/Counseling:      Cardiovascular:      General: Screening Current          Diabetes:      General: Screening Current          Colorectal Cancer:      General: Screening Current          Breast Cancer:      General: Screening Current          Cervical Cancer:      General: Screening Current          Osteoporosis:      General: Screening Current          AAA: General: Screening Not Indicated          Glaucoma:      General: Screening Current          HIV:      General: Screening Not Indicated          Hepatitis C:      General: Screening Not Indicated        Advanced Directives:   Patient has living will for healthcare,     Immunizations:      Influenza: Influenza UTD This Year      Pneumococcal: Lifetime Vaccine Completed      Shingrix: Shingrix Vaccine UTD      Zostavax: Zostavax Vaccine UTD      TDAP: Tdap Vaccine UTD          No exam data present     Physical Exam:     /80   Pulse 72   Ht 5' 6" (1 676 m)   Wt 57 2 kg (126 lb 3 2 oz)   SpO2 98%   BMI 20 37 kg/m²     Physical Exam   Constitutional: She is oriented to person, place, and time  She appears well-developed and well-nourished  HENT:   Head: Normocephalic and atraumatic  Right Ear: External ear normal    Left Ear: External ear normal    Mouth/Throat: Oropharynx is clear and moist    Impacted cerumen AD   Eyes: Conjunctivae are normal    Neck: Neck supple  Cardiovascular: Normal rate, regular rhythm and normal heart sounds  No murmur heard  Pulmonary/Chest: Effort normal and breath sounds normal  No respiratory distress  She has no wheezes  She has no rales  Abdominal: Soft  Bowel sounds are normal  She exhibits no distension and no mass  There is no tenderness  There is no rebound and no guarding  Musculoskeletal: Normal range of motion  Neurological: She is alert and oriented to person, place, and time  Skin: Skin is warm and dry  Psychiatric: She has a normal mood and affect   Her behavior is normal  Judgment and thought content normal        Ear cerumen removal  Date/Time: 7/1/2019 10:05 AM  Performed by: Brittany Lloyd MD  Authorized by: Brittany Lloyd MD     Patient location:  Clinic  Indications / Diagnosis:  Impacted cerumen  Other Assisting Provider: No    Consent:     Consent obtained:  Verbal    Consent given by:  Patient  Universal protocol:     Procedure explained and questions answered to patient or proxy's satisfaction: yes      Patient identity confirmed:  Verbally with patient  Procedure details:     Local anesthetic:  None    Location:  R ear    Procedure type: curette      Approach:  External  Post-procedure details:     Complication:  None    Hearing quality:  Normal    Patient tolerance of procedure:   Tolerated well, no immediate complications

## 2019-07-01 NOTE — ASSESSMENT & PLAN NOTE
10 y CVD risk 7 8%  Discussed modification of risk factors especially BP control   She is known to have white coat HTN and I have advised her to resume checking her BP at home

## 2019-08-01 DIAGNOSIS — E89.0 POSTOPERATIVE HYPOTHYROIDISM: ICD-10-CM

## 2019-08-01 LAB
T4 FREE SERPL-MCNC: 1.1 NG/DL (ref 0.8–1.8)
TSH SERPL-ACNC: 0.75 MIU/L (ref 0.4–4.5)

## 2019-08-01 RX ORDER — LEVOTHYROXINE SODIUM 50 MCG
50 TABLET ORAL DAILY
Qty: 90 TABLET | Refills: 1 | Status: SHIPPED | OUTPATIENT
Start: 2019-08-01 | End: 2019-08-06 | Stop reason: SDUPTHER

## 2019-08-06 DIAGNOSIS — E89.0 POSTOPERATIVE HYPOTHYROIDISM: ICD-10-CM

## 2019-08-06 RX ORDER — LEVOTHYROXINE SODIUM 50 MCG
50 TABLET ORAL DAILY
Qty: 30 TABLET | Refills: 1 | Status: SHIPPED | OUTPATIENT
Start: 2019-08-06 | End: 2019-10-15 | Stop reason: SDUPTHER

## 2019-09-03 LAB
LEFT EYE DIABETIC RETINOPATHY: NORMAL
RIGHT EYE DIABETIC RETINOPATHY: NORMAL

## 2019-10-11 DIAGNOSIS — Z12.39 BREAST CANCER SCREENING: ICD-10-CM

## 2019-10-11 DIAGNOSIS — E89.0 POSTOPERATIVE HYPOTHYROIDISM: ICD-10-CM

## 2019-10-11 RX ORDER — LEVOTHYROXINE SODIUM 50 MCG
50 TABLET ORAL DAILY
Qty: 30 TABLET | Refills: 0 | Status: CANCELLED | OUTPATIENT
Start: 2019-10-11

## 2019-10-12 ENCOUNTER — IMMUNIZATIONS (OUTPATIENT)
Dept: INTERNAL MEDICINE CLINIC | Facility: CLINIC | Age: 69
End: 2019-10-12
Payer: COMMERCIAL

## 2019-10-12 DIAGNOSIS — Z23 ENCOUNTER FOR IMMUNIZATION: ICD-10-CM

## 2019-10-12 PROCEDURE — G0008 ADMIN INFLUENZA VIRUS VAC: HCPCS

## 2019-10-12 PROCEDURE — 90662 IIV NO PRSV INCREASED AG IM: CPT

## 2019-10-15 DIAGNOSIS — E89.0 POSTOPERATIVE HYPOTHYROIDISM: ICD-10-CM

## 2019-10-15 RX ORDER — LEVOTHYROXINE SODIUM 50 MCG
50 TABLET ORAL DAILY
Qty: 90 TABLET | Refills: 0 | Status: SHIPPED | OUTPATIENT
Start: 2019-10-15 | End: 2020-04-14

## 2019-12-10 ENCOUNTER — ANNUAL EXAM (OUTPATIENT)
Dept: OBGYN CLINIC | Facility: CLINIC | Age: 69
End: 2019-12-10
Payer: COMMERCIAL

## 2019-12-10 VITALS
WEIGHT: 129.8 LBS | HEIGHT: 66 IN | SYSTOLIC BLOOD PRESSURE: 160 MMHG | BODY MASS INDEX: 20.86 KG/M2 | DIASTOLIC BLOOD PRESSURE: 84 MMHG

## 2019-12-10 DIAGNOSIS — M85.80 OSTEOPENIA, UNSPECIFIED LOCATION: ICD-10-CM

## 2019-12-10 DIAGNOSIS — Z12.39 BREAST CANCER SCREENING: Primary | ICD-10-CM

## 2019-12-10 DIAGNOSIS — Z01.419 ENCOUNTER FOR GYNECOLOGICAL EXAMINATION: ICD-10-CM

## 2019-12-10 PROCEDURE — G0101 CA SCREEN;PELVIC/BREAST EXAM: HCPCS | Performed by: OBSTETRICS & GYNECOLOGY

## 2019-12-10 RX ORDER — CAL/D3/MAG11/ZINC/COP/MANG/BOR 600 MG-800
TABLET ORAL
COMMUNITY
Start: 2019-06-28

## 2019-12-10 NOTE — PROGRESS NOTES
Assessment/Plan:    No problem-specific Assessment & Plan notes found for this encounter  Normal gynecological physical examination  Self-breast examination stressed  Mammogram ordered  Discussed regular exercise, healthy diet, importance of vitamin D and calcium supplements  Discussed importance of sun block use during periods of prolonged sun exposure  Patient will be seen in 1 year for routine gynecologic and medical examination  Patient will call office for any problems, concerns, or issues which may arise during the interim  Diagnoses and all orders for this visit:    Breast cancer screening  -     Mammo screening bilateral w cad; Future    Osteopenia, unspecified location  -     DXA bone density spine hip and pelvis; Future    Other orders  -     Calcium Carbonate-Vit D-Min (CALTRATE 600+D PLUS MINERALS) 600-800 MG-UNIT TABS          Subjective:      Patient ID: Claudette Mayer is a 77 y o  female  Pt is a 70y/o female who presents today for her annual gynecologic and medical examination  Pt is postmenopausal and has no complaints at this time  She denies significant vasomotor symptoms  She also denies vaginal bleeding or irritation, abdominal or pelvic pain, or changes in bowel or bladder habits  She states that she performs regular self breast exams and denies significant breast masses, tenderness, or skin changes  She denies tobacco use  She states that she is up to date with mammography and colonoscopy screening  She also receives a DEXA scan every other year for osteopenia  Pt is followed for HLD   Pt is s/p thyroidectomy for thyroid cancer and is followed for post-op hypothyroidism      The following portions of the patient's history were reviewed and updated as appropriate: allergies, current medications, past family history, past medical history, past social history, past surgical history and problem list     Review of Systems   Constitutional: Negative    Negative for appetite change, diaphoresis, fatigue, fever and unexpected weight change  HENT: Negative  Eyes: Negative  Respiratory: Negative  Cardiovascular: Negative  Gastrointestinal: Negative  Negative for abdominal pain, blood in stool, constipation, diarrhea, nausea and vomiting  Endocrine: Negative  Negative for cold intolerance and heat intolerance  Genitourinary: Negative  Negative for dysuria, frequency, hematuria, urgency, vaginal bleeding, vaginal discharge and vaginal pain  Musculoskeletal: Negative  Skin: Negative  Allergic/Immunologic: Negative  Neurological: Negative  Hematological: Negative  Negative for adenopathy  Psychiatric/Behavioral: Negative  Objective:      /84   Ht 5' 6" (1 676 m)   Wt 58 9 kg (129 lb 12 8 oz)   BMI 20 95 kg/m²          Physical Exam   Constitutional: She appears well-developed  HENT:   Head: Normocephalic  Eyes: Pupils are equal, round, and reactive to light  Neck: Normal range of motion  Neck supple  Followed for hypothyroidism   Cardiovascular: Normal rate and regular rhythm  Followed for HLD   Pulmonary/Chest: Effort normal and breath sounds normal  Right breast exhibits no inverted nipple, no mass, no nipple discharge, no skin change and no tenderness  Left breast exhibits no inverted nipple, no mass, no nipple discharge, no skin change and no tenderness  Breasts are symmetrical    Abdominal: Soft  Normal appearance and bowel sounds are normal    Genitourinary: Rectum normal and vagina normal  Rectal exam shows guaiac negative stool  Pelvic exam was performed with patient supine  There is no rash or lesion on the right labia  There is no rash or lesion on the left labia  Uterus is not enlarged and not tender  Cervix exhibits no discharge and no friability  Right adnexum displays no mass, no tenderness and no fullness  Left adnexum displays no mass, no tenderness and no fullness   No erythema, tenderness or bleeding in the vagina  No vaginal discharge found  Genitourinary Comments: Atrophic vaginitis  Good pelvic floor support   Lymphadenopathy:     She has no cervical adenopathy  She has no axillary adenopathy  Right: No inguinal and no supraclavicular adenopathy present  Left: No inguinal and no supraclavicular adenopathy present  Neurological: She is alert  Skin: Skin is intact  No rash noted  Psychiatric: She has a normal mood and affect   Her speech is normal and behavior is normal  Judgment and thought content normal  Cognition and memory are normal

## 2020-04-11 DIAGNOSIS — E89.0 POSTOPERATIVE HYPOTHYROIDISM: ICD-10-CM

## 2020-04-14 RX ORDER — LEVOTHYROXINE SODIUM 50 MCG
TABLET ORAL
Qty: 90 TABLET | Refills: 0 | Status: SHIPPED | OUTPATIENT
Start: 2020-04-14 | End: 2020-04-23 | Stop reason: DRUGHIGH

## 2020-04-23 ENCOUNTER — TELEPHONE (OUTPATIENT)
Dept: ENDOCRINOLOGY | Facility: CLINIC | Age: 70
End: 2020-04-23

## 2020-04-23 DIAGNOSIS — E89.0 POSTOPERATIVE HYPOTHYROIDISM: Primary | ICD-10-CM

## 2020-04-23 DIAGNOSIS — C73 PAPILLARY THYROID CARCINOMA (HCC): Primary | ICD-10-CM

## 2020-04-23 LAB
REF LAB TEST NAME: NORMAL
REF LAB TEST: NORMAL
SL AMB CLIENT CONTACT: NORMAL
T4 FREE SERPL-MCNC: 1.5 NG/DL (ref 0.8–1.8)
THYROGLOB AB SERPL-ACNC: <0.1 NG/ML
THYROGLOB AB SERPL-ACNC: <1 IU/ML
TSH SERPL-ACNC: 1.45 MIU/L (ref 0.4–4.5)

## 2020-04-23 RX ORDER — LEVOTHYROXINE SODIUM 75 MCG
75 TABLET ORAL DAILY
Qty: 30 TABLET | Refills: 4 | Status: SHIPPED | OUTPATIENT
Start: 2020-04-23 | End: 2020-07-06

## 2020-04-30 ENCOUNTER — TELEMEDICINE (OUTPATIENT)
Dept: ENDOCRINOLOGY | Facility: CLINIC | Age: 70
End: 2020-04-30
Payer: COMMERCIAL

## 2020-04-30 DIAGNOSIS — C73 PAPILLARY THYROID CARCINOMA (HCC): ICD-10-CM

## 2020-04-30 DIAGNOSIS — E89.0 POSTOPERATIVE HYPOTHYROIDISM: Primary | ICD-10-CM

## 2020-04-30 PROCEDURE — 99442 PR PHYS/QHP TELEPHONE EVALUATION 11-20 MIN: CPT | Performed by: NURSE PRACTITIONER

## 2020-06-09 LAB
25(OH)D3 SERPL-MCNC: 48 NG/ML (ref 30–100)
ALBUMIN SERPL-MCNC: 4.1 G/DL (ref 3.6–5.1)
ALBUMIN/GLOB SERPL: 1.5 (CALC) (ref 1–2.5)
ALP SERPL-CCNC: 60 U/L (ref 37–153)
ALT SERPL-CCNC: 14 U/L (ref 6–29)
AST SERPL-CCNC: 24 U/L (ref 10–35)
BASOPHILS # BLD AUTO: 20 CELLS/UL (ref 0–200)
BASOPHILS NFR BLD AUTO: 0.5 %
BILIRUB SERPL-MCNC: 0.9 MG/DL (ref 0.2–1.2)
BUN SERPL-MCNC: 10 MG/DL (ref 7–25)
BUN/CREAT SERPL: NORMAL (CALC) (ref 6–22)
CALCIUM SERPL-MCNC: 9.3 MG/DL (ref 8.6–10.4)
CHLORIDE SERPL-SCNC: 104 MMOL/L (ref 98–110)
CHOLEST SERPL-MCNC: 215 MG/DL
CHOLEST/HDLC SERPL: 3.4 (CALC)
CO2 SERPL-SCNC: 31 MMOL/L (ref 20–32)
CREAT SERPL-MCNC: 0.68 MG/DL (ref 0.5–0.99)
EOSINOPHIL # BLD AUTO: 88 CELLS/UL (ref 15–500)
EOSINOPHIL NFR BLD AUTO: 2.2 %
ERYTHROCYTE [DISTWIDTH] IN BLOOD BY AUTOMATED COUNT: 12.3 % (ref 11–15)
GLOBULIN SER CALC-MCNC: 2.7 G/DL (CALC) (ref 1.9–3.7)
GLUCOSE SERPL-MCNC: 99 MG/DL (ref 65–99)
HCT VFR BLD AUTO: 36.6 % (ref 35–45)
HDLC SERPL-MCNC: 63 MG/DL
HGB BLD-MCNC: 12.2 G/DL (ref 11.7–15.5)
LDLC SERPL CALC-MCNC: 123 MG/DL (CALC)
LYMPHOCYTES # BLD AUTO: 1216 CELLS/UL (ref 850–3900)
LYMPHOCYTES NFR BLD AUTO: 30.4 %
MCH RBC QN AUTO: 30.7 PG (ref 27–33)
MCHC RBC AUTO-ENTMCNC: 33.3 G/DL (ref 32–36)
MCV RBC AUTO: 92 FL (ref 80–100)
MONOCYTES # BLD AUTO: 408 CELLS/UL (ref 200–950)
MONOCYTES NFR BLD AUTO: 10.2 %
NEUTROPHILS # BLD AUTO: 2268 CELLS/UL (ref 1500–7800)
NEUTROPHILS NFR BLD AUTO: 56.7 %
NONHDLC SERPL-MCNC: 152 MG/DL (CALC)
PLATELET # BLD AUTO: 215 THOUSAND/UL (ref 140–400)
PMV BLD REES-ECKER: 9.8 FL (ref 7.5–12.5)
POTASSIUM SERPL-SCNC: 3.8 MMOL/L (ref 3.5–5.3)
PROT SERPL-MCNC: 6.8 G/DL (ref 6.1–8.1)
RBC # BLD AUTO: 3.98 MILLION/UL (ref 3.8–5.1)
SL AMB EGFR AFRICAN AMERICAN: 104 ML/MIN/1.73M2
SL AMB EGFR NON AFRICAN AMERICAN: 90 ML/MIN/1.73M2
SODIUM SERPL-SCNC: 141 MMOL/L (ref 135–146)
T4 FREE SERPL-MCNC: 1.7 NG/DL (ref 0.8–1.8)
TRIGL SERPL-MCNC: 172 MG/DL
TSH SERPL-ACNC: 0.03 MIU/L (ref 0.4–4.5)
WBC # BLD AUTO: 4 THOUSAND/UL (ref 3.8–10.8)

## 2020-06-10 ENCOUNTER — TELEPHONE (OUTPATIENT)
Dept: ENDOCRINOLOGY | Facility: CLINIC | Age: 70
End: 2020-06-10

## 2020-06-10 DIAGNOSIS — E89.0 POSTOPERATIVE HYPOTHYROIDISM: Primary | ICD-10-CM

## 2020-07-04 DIAGNOSIS — C73 PAPILLARY THYROID CARCINOMA (HCC): ICD-10-CM

## 2020-07-06 ENCOUNTER — OFFICE VISIT (OUTPATIENT)
Dept: INTERNAL MEDICINE CLINIC | Facility: CLINIC | Age: 70
End: 2020-07-06
Payer: COMMERCIAL

## 2020-07-06 VITALS
BODY MASS INDEX: 19.93 KG/M2 | HEART RATE: 79 BPM | OXYGEN SATURATION: 98 % | HEIGHT: 66 IN | DIASTOLIC BLOOD PRESSURE: 82 MMHG | RESPIRATION RATE: 16 BRPM | SYSTOLIC BLOOD PRESSURE: 160 MMHG | TEMPERATURE: 98.2 F | WEIGHT: 124 LBS

## 2020-07-06 DIAGNOSIS — E55.9 VITAMIN D DEFICIENCY: ICD-10-CM

## 2020-07-06 DIAGNOSIS — E78.2 MIXED HYPERLIPIDEMIA: ICD-10-CM

## 2020-07-06 DIAGNOSIS — Z00.00 MEDICARE ANNUAL WELLNESS VISIT, SUBSEQUENT: Primary | ICD-10-CM

## 2020-07-06 DIAGNOSIS — H61.21 IMPACTED CERUMEN OF RIGHT EAR: ICD-10-CM

## 2020-07-06 DIAGNOSIS — E89.0 POSTOPERATIVE HYPOTHYROIDISM: ICD-10-CM

## 2020-07-06 DIAGNOSIS — C73 PAPILLARY THYROID CARCINOMA (HCC): ICD-10-CM

## 2020-07-06 DIAGNOSIS — R03.0 ELEVATED BLOOD PRESSURE READING IN OFFICE WITH WHITE COAT SYNDROME, WITHOUT DIAGNOSIS OF HYPERTENSION: ICD-10-CM

## 2020-07-06 PROCEDURE — 1170F FXNL STATUS ASSESSED: CPT | Performed by: INTERNAL MEDICINE

## 2020-07-06 PROCEDURE — 4040F PNEUMOC VAC/ADMIN/RCVD: CPT | Performed by: INTERNAL MEDICINE

## 2020-07-06 PROCEDURE — 69210 REMOVE IMPACTED EAR WAX UNI: CPT | Performed by: INTERNAL MEDICINE

## 2020-07-06 PROCEDURE — 1160F RVW MEDS BY RX/DR IN RCRD: CPT | Performed by: INTERNAL MEDICINE

## 2020-07-06 PROCEDURE — 1125F AMNT PAIN NOTED PAIN PRSNT: CPT | Performed by: INTERNAL MEDICINE

## 2020-07-06 PROCEDURE — 1036F TOBACCO NON-USER: CPT | Performed by: INTERNAL MEDICINE

## 2020-07-06 PROCEDURE — G0439 PPPS, SUBSEQ VISIT: HCPCS | Performed by: INTERNAL MEDICINE

## 2020-07-06 PROCEDURE — 3008F BODY MASS INDEX DOCD: CPT | Performed by: INTERNAL MEDICINE

## 2020-07-06 RX ORDER — LEVOTHYROXINE SODIUM 75 MCG
TABLET ORAL
Qty: 90 TABLET | Refills: 1 | Status: SHIPPED | OUTPATIENT
Start: 2020-07-06 | End: 2021-02-28 | Stop reason: SDUPTHER

## 2020-07-06 NOTE — PATIENT INSTRUCTIONS
Medicare Preventive Visit Patient Instructions  Thank you for completing your Welcome to Medicare Visit or Medicare Annual Wellness Visit today  Your next wellness visit will be due in one year (7/6/2021)  The screening/preventive services that you may require over the next 5-10 years are detailed below  Some tests may not apply to you based off risk factors and/or age  Screening tests ordered at today's visit but not completed yet may show as past due  Also, please note that scanned in results may not display below  Preventive Screenings:  Service Recommendations Previous Testing/Comments   Colorectal Cancer Screening  * Colonoscopy    * Fecal Occult Blood Test (FOBT)/Fecal Immunochemical Test (FIT)  * Fecal DNA/Cologuard Test  * Flexible Sigmoidoscopy Age: 54-65 years old   Colonoscopy: every 10 years (may be performed more frequently if at higher risk)  OR  FOBT/FIT: every 1 year  OR  Cologuard: every 3 years  OR  Sigmoidoscopy: every 5 years  Screening may be recommended earlier than age 48 if at higher risk for colorectal cancer  Also, an individualized decision between you and your healthcare provider will decide whether screening between the ages of 74-80 would be appropriate  Colonoscopy: 09/13/2018  FOBT/FIT: Not on file  Cologuard: Not on file  Sigmoidoscopy: Not on file         Breast Cancer Screening Age: 36 years old  Frequency: every 1-2 years  Not required if history of left and right mastectomy Mammogram: 10/10/2019       Cervical Cancer Screening Between the ages of 21-29, pap smear recommended once every 3 years  Between the ages of 33-67, can perform pap smear with HPV co-testing every 5 years     Recommendations may differ for women with a history of total hysterectomy, cervical cancer, or abnormal pap smears in past  Pap Smear: 12/10/2019       Hepatitis C Screening Once for adults born between 1945 and 1965  More frequently in patients at high risk for Hepatitis C Hep C Antibody: 06/01/2017       Diabetes Screening 1-2 times per year if you're at risk for diabetes or have pre-diabetes Fasting glucose: No results in last 5 years   A1C: No results in last 5 years       Cholesterol Screening Once every 5 years if you don't have a lipid disorder  May order more often based on risk factors  Lipid panel: 06/08/2020         Other Preventive Screenings Covered by Medicare:  1  Abdominal Aortic Aneurysm (AAA) Screening: covered once if your at risk  You're considered to be at risk if you have a family history of AAA  2  Lung Cancer Screening: covers low dose CT scan once per year if you meet all of the following conditions: (1) Age 50-69; (2) No signs or symptoms of lung cancer; (3) Current smoker or have quit smoking within the last 15 years; (4) You have a tobacco smoking history of at least 30 pack years (packs per day multiplied by number of years you smoked); (5) You get a written order from a healthcare provider  3  Glaucoma Screening: covered annually if you're considered high risk: (1) You have diabetes OR (2) Family history of glaucoma OR (3)  aged 48 and older OR (3)  American aged 72 and older  3  Osteoporosis Screening: covered every 2 years if you meet one of the following conditions: (1) You're estrogen deficient and at risk for osteoporosis based off medical history and other findings; (2) Have a vertebral abnormality; (3) On glucocorticoid therapy for more than 3 months; (4) Have primary hyperparathyroidism; (5) On osteoporosis medications and need to assess response to drug therapy  · Last bone density test (DXA Scan): 10/09/2018  5  HIV Screening: covered annually if you're between the age of 12-76  Also covered annually if you are younger than 13 and older than 72 with risk factors for HIV infection  For pregnant patients, it is covered up to 3 times per pregnancy      Immunizations:  Immunization Recommendations   Influenza Vaccine Annual influenza vaccination during flu season is recommended for all persons aged >= 6 months who do not have contraindications   Pneumococcal Vaccine (Prevnar and Pneumovax)  * Prevnar = PCV13  * Pneumovax = PPSV23   Adults 25-60 years old: 1-3 doses may be recommended based on certain risk factors  Adults 72 years old: Prevnar (PCV13) vaccine recommended followed by Pneumovax (PPSV23) vaccine  If already received PPSV23 since turning 65, then PCV13 recommended at least one year after PPSV23 dose  Hepatitis B Vaccine 3 dose series if at intermediate or high risk (ex: diabetes, end stage renal disease, liver disease)   Tetanus (Td) Vaccine - COST NOT COVERED BY MEDICARE PART B Following completion of primary series, a booster dose should be given every 10 years to maintain immunity against tetanus  Td may also be given as tetanus wound prophylaxis  Tdap Vaccine - COST NOT COVERED BY MEDICARE PART B Recommended at least once for all adults  For pregnant patients, recommended with each pregnancy  Shingles Vaccine (Shingrix) - COST NOT COVERED BY MEDICARE PART B  2 shot series recommended in those aged 48 and above     Health Maintenance Due:      Topic Date Due    MAMMOGRAM  10/10/2020    CRC Screening: Colonoscopy  09/13/2023    Hepatitis C Screening  Completed     Immunizations Due:      Topic Date Due    Influenza Vaccine  07/01/2020     Advance Directives   What are advance directives? Advance directives are legal documents that state your wishes and plans for medical care  These plans are made ahead of time in case you lose your ability to make decisions for yourself  Advance directives can apply to any medical decision, such as the treatments you want, and if you want to donate organs  What are the types of advance directives? There are many types of advance directives, and each state has rules about how to use them  You may choose a combination of any of the following:  · Living will:   This is a written record of the treatment you want  You can also choose which treatments you do not want, which to limit, and which to stop at a certain time  This includes surgery, medicine, IV fluid, and tube feedings  · Durable power of  for healthcare [facility]): This is a written record that states who you want to make healthcare choices for you when you are unable to make them for yourself  This person, called a proxy, is usually a family member or a friend  You may choose more than 1 proxy  · Do not resuscitate (DNR) order:  A DNR order is used in case your heart stops beating or you stop breathing  It is a request not to have certain forms of treatment, such as CPR  A DNR order may be included in other types of advance directives  · Medical directive: This covers the care that you want if you are in a coma, near death, or unable to make decisions for yourself  You can list the treatments you want for each condition  Treatment may include pain medicine, surgery, blood transfusions, dialysis, IV or tube feedings, and a ventilator (breathing machine)  · Values history: This document has questions about your views, beliefs, and how you feel and think about life  This information can help others choose the care that you would choose  Why are advance directives important? An advance directive helps you control your care  Although spoken wishes may be used, it is better to have your wishes written down  Spoken wishes can be misunderstood, or not followed  Treatments may be given even if you do not want them  An advance directive may make it easier for your family to make difficult choices about your care  © Copyright [employer] 2018 Information is for End User's use only and may not be sold, redistributed or otherwise used for commercial purposes   All illustrations and images included in CareNotes® are the copyrighted property of [employer]  or [employer]

## 2020-07-06 NOTE — PROGRESS NOTES
Assessment and Plan:     Problem List Items Addressed This Visit        Endocrine    Papillary thyroid carcinoma ([address] )    Postoperative hypothyroidism     TSH managed by endocrinology            Other    Medicare annual wellness visit, subsequent - Primary    Hyperlipidemia     TG high this time  Continue to monitor  Dietary chagnes implemented         Relevant Orders    Lipid panel    CBC and differential    Comprehensive metabolic panel    Elevated blood pressure reading in office with white coat syndrome, without diagnosis of hypertension     Suspect white coat HTN  Start checking at home   Goal <140/90  Call if readings are above that           Other Visit Diagnoses     Vitamin D deficiency        Relevant Orders    Vitamin D 25 hydroxy          Depression Screening and Follow-up Plan: Patient's depression screening was positive with a PHQ-2 score of 3  Their PHQ-9 score was 4  Clincally patient does not have depression  No treatment is required  Preventive health issues were discussed with patient, and age appropriate screening tests were ordered as noted in patient's After Visit Summary  Personalized health advice and appropriate referrals for health education or preventive services given if needed, as noted in patient's After Visit Summary  History of Present Illness:     Patient presents for Welcome to Medicare visit  Patient Care Team:  Liv Little MD as PCP - Willia Duverney, MD as PCP - Endocrinology (Endocrinology)  MD Brennan Patel MD Vesta Shore, MD Kaylene Citizen, MD Verdel Pries, MD     Review of Systems:     Review of Systems   Constitutional: Positive for fatigue  Negative for chills, fever and unexpected weight change  HENT: Negative for congestion  Respiratory: Negative for cough and shortness of breath  Cardiovascular: Negative for chest pain, palpitations and leg swelling  Gastrointestinal: Negative for constipation and diarrhea  Genitourinary: Negative for difficulty urinating, dysuria and urgency  Musculoskeletal: Negative for arthralgias and back pain  Right second toe swelling for a few months, no pain   Neurological: Negative for dizziness and headaches  Psychiatric/Behavioral: Positive for dysphoric mood  Negative for sleep disturbance  The patient is nervous/anxious           Some days feels down living and caring for elderly father with dementia      Problem List:     Patient Active Problem List   Diagnosis    Medicare annual wellness visit, subsequent    Hyperlipidemia    Impaired fasting glucose    Osteopenia    Postoperative hypothyroidism    Papillary thyroid carcinoma ([facility] )    Elevated blood pressure reading in office with white coat syndrome, without diagnosis of hypertension      Past Medical and Surgical History:     Past Medical History:   Diagnosis Date    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[facility] )     last assessed 11/17/17    Seborrheic keratosis     last assessed 5/1/15, resolved 11/2/15    Stool guaiac positive     last assessed 11/19/14, resolved 11/2/15    Ulcerative colitis without complications ([facility] )     Vaginal atrophy     last assessed 11/19/14, resolved 11/2/15     Past Surgical History:   Procedure Laterality Date    THYROIDECTOMY      last assessed 6/13/16    TOOTH EXTRACTION      last assessed 6/13/16      Family History:     Family History   Problem Relation Age of Onset    COPD Mother     Seizures Mother     Transient ischemic attack Mother         & cerebral infarction    Hypertension Father     Heart murmur Father     Heart disease Father         pacemaker placement    Breast cancer Sister 58        Stage 1 at diagnosis    Diabetes Maternal Grandmother     Cancer Family       Social History:        Social History     Socioeconomic History    Marital status:      Spouse name: None    Number of children: None    Years of education: None    Highest education level: None   Occupational History    None   Social Needs    Financial resource strain: None    Food insecurity:     Worry: None     Inability: None    Transportation needs:     Medical: None     Non-medical: None   Tobacco Use    Smoking status: Never Smoker    Smokeless tobacco: Never Used   Substance and Sexual Activity    Alcohol use: Yes     Comment: Minimum consumption     Drug use: No    Sexual activity: None   Lifestyle    Physical activity:     Days per week: None     Minutes per session: None    Stress: None   Relationships    Social connections:     Talks on phone: None     Gets together: None     Attends [employer] service: None     Active member of club or organization: None     Attends meetings of clubs or organizations: None     Relationship status: None    Intimate partner violence:     Fear of current or ex partner: None     Emotionally abused: None     Physically abused: None     Forced sexual activity: None   Other Topics Concern    None   Social History Narrative    Feels safe at home     Home environment domestic violence: denied       Medications and Allergies:     Current Outpatient Medications   Medication Sig Dispense Refill    aspirin (ASPIRIN ADULT LOW STRENGTH) 81 mg EC tablet Take 1 tablet by mouth daily      Calcium Carbonate-Vit D-Min (CALTRATE 600+D PLUS MINERALS) 600-800 MG-UNIT TABS       Multiple Vitamins-Minerals (CENTRUM SILVER ULTRA WOMENS PO) Take 1 tablet by mouth daily      SYNTHROID 75 MCG tablet Take 1 tablet (75 mcg total) by mouth daily 30 tablet 4     No current facility-administered medications for this visit  Allergies   Allergen Reactions    Mesalamine Hives and Rash     Annotation - [id number]:  Allergy is to Suppository      Immunizations:     Immunization History   Administered Date(s) Administered    DTaP 5 05/23/2008    Influenza Split High Dose Preservative Free IM 10/08/2016, 10/07/2017    Influenza, high dose seasonal 0 5 mL 10/13/2018, 10/12/2019    Pneumococcal Conjugate 13-Valent 09/16/2016    Pneumococcal Polysaccharide PPV23 08/20/2010, 09/19/2017    Td (adult), adsorbed 10/01/1998, 05/23/2008    Tdap 06/29/2018    Zoster 06/22/2012    Zoster Vaccine Recombinant 11/11/2018, 03/01/2019      Health Maintenance:         Topic Date Due    MAMMOGRAM  10/10/2020    CRC Screening: Colonoscopy  09/13/2023    Hepatitis C Screening  Completed         Topic Date Due    Influenza Vaccine  07/01/2020      Medicare Screening Tests and Risk Assessments:     Juanis Galvez is here for her Subsequent Wellness visit  Health Risk Assessment:   Patient rates overall health as very good  Patient feels that their physical health rating is same  Eyesight was rated as same  Hearing was rated as same  Patient feels that their emotional and mental health rating is same  Pain experienced in the last 7 days has been none  Patient states that she has experienced no weight loss or gain in last 6 months  Depression Screening:   PHQ-2 Score: 3  PHQ-9 Score: 4      Fall Risk Screening: In the past year, patient has experienced: no history of falling in past year      Urinary Incontinence Screening:   Patient has not leaked urine accidently in the last six months  Home Safety:  Patient does not have trouble with stairs inside or outside of their home  Patient has working smoke alarms and has working carbon monoxide detector  Home safety hazards include: none  Nutrition:   Current diet is Regular  Medications:   Patient is not currently taking any over-the-counter supplements  Patient is able to manage medications  Activities of Daily Living (ADLs)/Instrumental Activities of Daily Living (IADLs):   Walk and transfer into and out of bed and chair?: Yes  Dress and groom yourself?: Yes    Bathe or shower yourself?: Yes    Feed yourself?  Yes  Do your laundry/housekeeping?: Yes  Manage your money, pay your bills and track your expenses?: Yes  Make your own meals?: Yes    Do your own shopping?: Yes    Durable Medical Equipment Suppliers  none    Previous Hospitalizations:   Any hospitalizations or ED visits within the last 12 months?: No      Advance Care Planning:   Living will: Yes    Durable POA for healthcare: Yes    Advanced directive: Yes      Cognitive Screening:   Provider or family/friend/caregiver concerned regarding cognition?: No    PREVENTIVE SCREENINGS      Cardiovascular Screening:    General: Screening Not Indicated and History Lipid Disorder      Diabetes Screening:     General: Screening Current      Colorectal Cancer Screening:     General: Screening Current      Breast Cancer Screening:     General: Screening Current      Cervical Cancer Screening:    General: Screening Not Indicated      Abdominal Aortic Aneurysm (AAA) Screening:        General: Screening Not Indicated      Lung Cancer Screening:     General: Screening Not Indicated      Hepatitis C Screening:    General: Screening Current    No exam data present     Physical Exam:     /82   Pulse 79   Temp 98 2 °F (36 8 °C)   Resp 16   Ht 5' 6" (1 676 m)   Wt 56 2 kg (124 lb)   SpO2 98%   BMI 20 01 kg/m²     Physical Exam   Constitutional: She is oriented to person, place, and time  She appears well-developed and well-nourished  HENT:   Head: Normocephalic and atraumatic  Right Ear: External ear normal    Left Ear: External ear normal    Impacted cerumen right     Eyes: Conjunctivae are normal    Neck: Neck supple  Cardiovascular: Normal rate, regular rhythm and normal heart sounds  No murmur heard  Pulmonary/Chest: Effort normal and breath sounds normal  No stridor  No respiratory distress  She has no wheezes  She has no rales  Abdominal: Soft  She exhibits no distension and no mass  There is no tenderness  There is no rebound and no guarding  Musculoskeletal: She exhibits deformity (mild, right second toe)  She exhibits no edema     Neurological: She is alert and oriented to person, place, and time  Skin: Skin is warm and dry  Psychiatric: She has a normal mood and affect  Her behavior is normal  Judgment and thought content normal    Ear cerumen removal  Date/Time: 7/6/2020 10:20 AM  Performed by: Andree Randolph MD  Authorized by: Andree Randolph MD     Patient location:  Clinic  Indications / Diagnosis:  Impacted cerumen  Other Assisting Provider: No    Consent:     Consent obtained:  Verbal    Consent given by:  Patient    Risks discussed:  Bleeding, dizziness, infection, incomplete removal, pain and TM perforation    Alternatives discussed:  No treatment  Universal protocol:     Procedure explained and questions answered to patient or proxy's satisfaction: yes      Immediately prior to procedure a time out was called: yes      Patient identity confirmed:  Verbally with patient  Procedure details:     Local anesthetic:  None    Location:  R ear    Procedure type: curette      Approach:  External  Post-procedure details:     Complication:  None    Hearing quality:  Improved    Patient tolerance of procedure:   Tolerated well, no immediate complications          Andree Randolph MD

## 2020-07-26 LAB
T4 FREE SERPL-MCNC: 1.5 NG/DL (ref 0.8–1.8)
TSH SERPL-ACNC: 0.06 MIU/L (ref 0.4–4.5)

## 2020-07-29 ENCOUNTER — TELEPHONE (OUTPATIENT)
Dept: ENDOCRINOLOGY | Facility: CLINIC | Age: 70
End: 2020-07-29

## 2020-07-29 NOTE — TELEPHONE ENCOUNTER
Please let patient know that her TSH remains overly suppressed  We would like it to be low normal  As per last telephone note, she is taking 75 mcg M-F with a half tablet on Saturday and Sunday  At this point, she can omit the weekend tablets  Repeat blood work in approximately 6 weeks

## 2020-07-30 ENCOUNTER — TELEPHONE (OUTPATIENT)
Dept: ENDOCRINOLOGY | Facility: CLINIC | Age: 70
End: 2020-07-30

## 2020-07-30 DIAGNOSIS — E89.0 POSTOPERATIVE HYPOTHYROIDISM: Primary | ICD-10-CM

## 2020-09-16 LAB
T4 FREE SERPL-MCNC: 1.3 NG/DL (ref 0.8–1.8)
TSH SERPL-ACNC: 2.01 MIU/L (ref 0.4–4.5)

## 2020-09-17 ENCOUNTER — TELEPHONE (OUTPATIENT)
Dept: ENDOCRINOLOGY | Facility: CLINIC | Age: 70
End: 2020-09-17

## 2020-09-17 DIAGNOSIS — E89.0 POSTOPERATIVE HYPOTHYROIDISM: Primary | ICD-10-CM

## 2020-09-17 DIAGNOSIS — E55.9 VITAMIN D DEFICIENCY: ICD-10-CM

## 2020-09-29 ENCOUNTER — CLINICAL SUPPORT (OUTPATIENT)
Dept: INTERNAL MEDICINE CLINIC | Facility: CLINIC | Age: 70
End: 2020-09-29
Payer: COMMERCIAL

## 2020-09-29 DIAGNOSIS — Z23 NEED FOR INFLUENZA VACCINATION: Primary | ICD-10-CM

## 2020-09-29 PROCEDURE — 90662 IIV NO PRSV INCREASED AG IM: CPT

## 2020-09-29 PROCEDURE — G0008 ADMIN INFLUENZA VIRUS VAC: HCPCS

## 2020-10-15 DIAGNOSIS — M85.80 OSTEOPENIA, UNSPECIFIED LOCATION: ICD-10-CM

## 2020-10-15 DIAGNOSIS — Z12.39 BREAST CANCER SCREENING: ICD-10-CM

## 2020-10-21 ENCOUNTER — TELEPHONE (OUTPATIENT)
Dept: OBGYN CLINIC | Facility: CLINIC | Age: 70
End: 2020-10-21

## 2020-10-27 ENCOUNTER — TELEPHONE (OUTPATIENT)
Dept: OBGYN CLINIC | Facility: CLINIC | Age: 70
End: 2020-10-27

## 2020-12-01 ENCOUNTER — PREPPED CHART (OUTPATIENT)
Dept: URBAN - METROPOLITAN AREA CLINIC 6 | Facility: CLINIC | Age: 70
End: 2020-12-01

## 2020-12-15 ENCOUNTER — ANNUAL EXAM (OUTPATIENT)
Dept: OBGYN CLINIC | Facility: CLINIC | Age: 70
End: 2020-12-15
Payer: COMMERCIAL

## 2020-12-15 VITALS
WEIGHT: 120 LBS | DIASTOLIC BLOOD PRESSURE: 70 MMHG | HEIGHT: 66 IN | SYSTOLIC BLOOD PRESSURE: 168 MMHG | BODY MASS INDEX: 19.29 KG/M2

## 2020-12-15 DIAGNOSIS — Z12.31 ENCOUNTER FOR SCREENING MAMMOGRAM FOR MALIGNANT NEOPLASM OF BREAST: ICD-10-CM

## 2020-12-15 DIAGNOSIS — Z01.419 ENCOUNTER FOR GYNECOLOGICAL EXAMINATION WITHOUT ABNORMAL FINDING: Primary | ICD-10-CM

## 2020-12-15 PROCEDURE — G0101 CA SCREEN;PELVIC/BREAST EXAM: HCPCS | Performed by: OBSTETRICS & GYNECOLOGY

## 2021-02-28 DIAGNOSIS — C73 PAPILLARY THYROID CARCINOMA (HCC): ICD-10-CM

## 2021-03-01 RX ORDER — LEVOTHYROXINE SODIUM 75 MCG
75 TABLET ORAL DAILY
Qty: 90 TABLET | Refills: 0 | Status: SHIPPED | OUTPATIENT
Start: 2021-03-01 | End: 2021-05-13 | Stop reason: SDUPTHER

## 2021-03-30 DIAGNOSIS — Z23 ENCOUNTER FOR IMMUNIZATION: ICD-10-CM

## 2021-04-03 ENCOUNTER — IMMUNIZATIONS (OUTPATIENT)
Dept: FAMILY MEDICINE CLINIC | Facility: HOSPITAL | Age: 71
End: 2021-04-03

## 2021-04-03 DIAGNOSIS — Z23 ENCOUNTER FOR IMMUNIZATION: Primary | ICD-10-CM

## 2021-04-03 PROCEDURE — 91300 SARS-COV-2 / COVID-19 MRNA VACCINE (PFIZER-BIONTECH) 30 MCG: CPT

## 2021-04-03 PROCEDURE — 0001A SARS-COV-2 / COVID-19 MRNA VACCINE (PFIZER-BIONTECH) 30 MCG: CPT

## 2021-04-25 ENCOUNTER — IMMUNIZATIONS (OUTPATIENT)
Dept: FAMILY MEDICINE CLINIC | Facility: HOSPITAL | Age: 71
End: 2021-04-25

## 2021-04-25 DIAGNOSIS — Z23 ENCOUNTER FOR IMMUNIZATION: Primary | ICD-10-CM

## 2021-04-25 PROCEDURE — 91300 SARS-COV-2 / COVID-19 MRNA VACCINE (PFIZER-BIONTECH) 30 MCG: CPT

## 2021-04-25 PROCEDURE — 0002A SARS-COV-2 / COVID-19 MRNA VACCINE (PFIZER-BIONTECH) 30 MCG: CPT

## 2021-05-11 LAB
T4 FREE SERPL-MCNC: 1.3 NG/DL (ref 0.8–1.8)
THYROGLOB AB SERPL-ACNC: <0.1 NG/ML
THYROGLOB AB SERPL-ACNC: <1 IU/ML
TSH SERPL-ACNC: 3.4 MIU/L (ref 0.4–4.5)

## 2021-05-13 ENCOUNTER — OFFICE VISIT (OUTPATIENT)
Dept: ENDOCRINOLOGY | Facility: CLINIC | Age: 71
End: 2021-05-13
Payer: COMMERCIAL

## 2021-05-13 VITALS
DIASTOLIC BLOOD PRESSURE: 86 MMHG | SYSTOLIC BLOOD PRESSURE: 144 MMHG | BODY MASS INDEX: 19.7 KG/M2 | HEIGHT: 66 IN | WEIGHT: 122.6 LBS | HEART RATE: 102 BPM

## 2021-05-13 DIAGNOSIS — E89.0 POSTOPERATIVE HYPOTHYROIDISM: Primary | ICD-10-CM

## 2021-05-13 DIAGNOSIS — C73 PAPILLARY THYROID CARCINOMA (HCC): ICD-10-CM

## 2021-05-13 PROCEDURE — 3008F BODY MASS INDEX DOCD: CPT | Performed by: NURSE PRACTITIONER

## 2021-05-13 PROCEDURE — 99214 OFFICE O/P EST MOD 30 MIN: CPT | Performed by: NURSE PRACTITIONER

## 2021-05-13 PROCEDURE — 1160F RVW MEDS BY RX/DR IN RCRD: CPT | Performed by: NURSE PRACTITIONER

## 2021-05-13 PROCEDURE — 1036F TOBACCO NON-USER: CPT | Performed by: NURSE PRACTITIONER

## 2021-05-13 RX ORDER — LEVOTHYROXINE SODIUM 75 MCG
75 TABLET ORAL DAILY
Qty: 90 TABLET | Refills: 0 | Status: SHIPPED | OUTPATIENT
Start: 2021-05-13 | End: 2021-10-04 | Stop reason: SDUPTHER

## 2021-05-13 NOTE — ASSESSMENT & PLAN NOTE
TSH above target range (low normal)  For now add 1/2 tablet on Saturday  Repeat thyroid function test in 6 weeks

## 2021-05-13 NOTE — PROGRESS NOTES
Established Patient Progress Note       Chief Complaint   Patient presents with    Hypothyroidism        History of Present Illness:     Blayne Bauer is a 72 y o  female with a history of thyroid cancer and postsurgical hypothyroidism  For the thyroid cancer she underwent total thyroidectomy July 2010  Her thyroglobulin levels have been undetectable  For the hypothyroidism she is currently taking Synthroid 75 mcg, 1 tablet Monday-Friday  She is taking this regularly and properly  She reports cold intolerance         Patient Active Problem List   Diagnosis    Medicare annual wellness visit, subsequent    Hyperlipidemia    Impaired fasting glucose    Osteopenia    Postoperative hypothyroidism    [facility]illary thyroid carcinoma ([facility] )    Elevated blood pressure reading in office with white coat syndrome, without diagnosis of hypertension      Past Medical History:   Diagnosis Date    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[facility] )     last assessed 11/17/17    Seborrheic keratosis     last assessed 5/1/15, resolved 11/2/15    Stool guaiac positive     last assessed 11/19/14, resolved 11/2/15    Ulcerative colitis without complications ([facility] )     Vaginal atrophy     last assessed 11/19/14, resolved 11/2/15      Past Surgical History:   Procedure Laterality Date    THYROIDECTOMY      last assessed 6/13/16    TOOTH EXTRACTION      last assessed 6/13/16      Family History   Problem Relation Age of Onset    COPD Mother     Seizures Mother     Transient ischemic attack Mother         & cerebral infarction    Hypertension Father     Heart murmur Father     Heart disease Father         pacemaker placement    Breast cancer Sister 58        Stage 1 at diagnosis    Diabetes Maternal Grandmother     Cancer Family      Social History     Tobacco Use    Smoking status: Never Smoker    Smokeless tobacco: Never Used   Substance Use Topics    Alcohol use: Yes     Comment: Minimum consumption      Allergies Allergen Reactions    Mesalamine Hives and Rash     [facility] [id number]: Allergy is to Suppository       Current Outpatient Medications:     aspirin (ASPIRIN ADULT LOW STRENGTH) 81 mg EC tablet, Take 1 tablet by mouth daily, Disp: , Rfl:     Calcium Carbonate-Vit D-Min (CALTRATE 600+D PLUS MINERALS) 600-800 MG-UNIT TABS, , Disp: , Rfl:     Multiple Vitamins-Minerals (CENTRUM SILVER ULTRA WOMENS PO), Take 1 tablet by mouth daily, Disp: , Rfl:     Synthroid 75 MCG tablet, Take 1 tablet (75 mcg total) by mouth daily Take 1 tablet Monday-Friday and 1/2 tablet on Saturday, Disp: 90 tablet, Rfl: 0    Review of Systems   Constitutional: Negative for chills and fever  HENT: Negative for sore throat and trouble swallowing  Eyes: Negative for visual disturbance  Respiratory: Negative for shortness of breath  Cardiovascular: Negative for chest pain and palpitations  Gastrointestinal: Negative for abdominal pain, constipation and diarrhea  Endocrine: Positive for cold intolerance  Negative for heat intolerance, polydipsia, polyphagia and polyuria  Genitourinary: Negative for frequency  Musculoskeletal: Negative for arthralgias and myalgias  Skin: Negative for rash  Neurological: Negative for dizziness and tremors  Hematological: Negative for adenopathy  Psychiatric/Behavioral: Negative for sleep disturbance  All other systems reviewed and are negative  Physical Exam:  Body mass index is 19 79 kg/m²  /86   Pulse 102   Ht 5' 6" (1 676 m)   Wt 55 6 kg (122 lb 9 6 oz)   BMI 19 79 kg/m²    Wt Readings from Last 3 Encounters:   05/13/21 55 6 kg (122 lb 9 6 oz)   12/15/20 54 4 kg (120 lb)   07/06/20 56 2 kg (124 lb)       Physical Exam  Vitals signs reviewed  Constitutional:       General: She is not in acute distress  Appearance: She is well-developed  HENT:      Head: Normocephalic and atraumatic     Eyes:      Conjunctiva/sclera: Conjunctivae normal       Pupils: Pupils are equal, round, and reactive to light  Neck:      Musculoskeletal: Normal range of motion and neck supple  Thyroid: No thyromegaly  Cardiovascular:      Rate and Rhythm: Normal rate and regular rhythm  Heart sounds: Normal heart sounds  Pulmonary:      Effort: Pulmonary effort is normal  No respiratory distress  Breath sounds: Normal breath sounds  No wheezing or rales  Abdominal:      General: Bowel sounds are normal  There is no distension  Palpations: Abdomen is soft  Tenderness: There is no abdominal tenderness  Musculoskeletal: Normal range of motion  Skin:     General: Skin is warm and dry  Neurological:      Mental Status: She is alert and oriented to person, place, and time  Labs: Labs reviewed with patient       Component      Latest Ref Rng & Units 5/10/2021   THYROGLOBULIN AB      < or = 1 IU/mL <1   THYROGLOBULIN      ng/mL <0 1 (L)   Free T4      0 8 - 1 8 ng/dL 1 3   TSH, POC      0 40 - 4 50 mIU/L 3 40         Impression & Plan:    Problem List Items Addressed This Visit        Endocrine    Postoperative hypothyroidism - Primary     TSH above target range (low normal)  For now add 1/2 tablet on Saturday  Repeat thyroid function test in 6 weeks  Relevant Medications    Synthroid 75 MCG tablet    Other Relevant Orders    T4, free    TSH, 3rd generation    T4, free    TSH, 3rd generation    Papillary thyroid carcinoma (HCC)     Thyroglobulin remains undetectable indicating low risk for recurrence          Relevant Medications    Synthroid 75 MCG tablet    Other Relevant Orders    Thyroglobulin          Orders Placed This Encounter   Procedures    T4, free     Standing Status:   Future     Standing Expiration Date:   5/13/2022    TSH, 3rd generation     This is a patient instruction: This test is non-fasting  Please drink two glasses of water morning of bloodwork          Standing Status:   Future     Standing Expiration Date:   5/13/2022    T4, free     Standing Status:   Future     Standing Expiration Date:   5/13/2023    TSH, 3rd generation     This is a patient instruction: This test is non-fasting  Please drink two glasses of water morning of bloodwork  Standing Status:   Future     Standing Expiration Date:   5/13/2023    Thyroglobulin     Thyroglobulin PANEL-- includes both quantitative thyroglobulin and thyroglobulin Antibody     Standing Status:   Future     Standing Expiration Date:   5/13/2023         Discussed with the patient and all questioned fully answered  She will call me if any problems arise        Follow-up appointment in 1 year     Counseled patient on diagnostic results, prognosis, risk and benefit of treatment options, instruction for management, importance of treatment compliance, Risk  factor reduction and impressions      Osiel Foote [address]

## 2021-06-25 LAB
25(OH)D3 SERPL-MCNC: 48 NG/ML (ref 30–100)
ALBUMIN SERPL-MCNC: 4.5 G/DL (ref 3.6–5.1)
ALBUMIN/GLOB SERPL: 1.7 (CALC) (ref 1–2.5)
ALP SERPL-CCNC: 58 U/L (ref 37–153)
ALT SERPL-CCNC: 13 U/L (ref 6–29)
AST SERPL-CCNC: 24 U/L (ref 10–35)
BASOPHILS # BLD AUTO: 29 CELLS/UL (ref 0–200)
BASOPHILS NFR BLD AUTO: 0.7 %
BILIRUB SERPL-MCNC: 1 MG/DL (ref 0.2–1.2)
BUN SERPL-MCNC: 18 MG/DL (ref 7–25)
BUN/CREAT SERPL: NORMAL (CALC) (ref 6–22)
CALCIUM SERPL-MCNC: 9.6 MG/DL (ref 8.6–10.4)
CHLORIDE SERPL-SCNC: 102 MMOL/L (ref 98–110)
CHOLEST SERPL-MCNC: 215 MG/DL
CHOLEST/HDLC SERPL: 2.7 (CALC)
CO2 SERPL-SCNC: 31 MMOL/L (ref 20–32)
CREAT SERPL-MCNC: 0.68 MG/DL (ref 0.5–0.99)
EOSINOPHIL # BLD AUTO: 139 CELLS/UL (ref 15–500)
EOSINOPHIL NFR BLD AUTO: 3.4 %
ERYTHROCYTE [DISTWIDTH] IN BLOOD BY AUTOMATED COUNT: 12.2 % (ref 11–15)
GLOBULIN SER CALC-MCNC: 2.7 G/DL (CALC) (ref 1.9–3.7)
GLUCOSE SERPL-MCNC: 97 MG/DL (ref 65–99)
HCT VFR BLD AUTO: 38.5 % (ref 35–45)
HDLC SERPL-MCNC: 81 MG/DL
HGB BLD-MCNC: 12.6 G/DL (ref 11.7–15.5)
LDLC SERPL CALC-MCNC: 115 MG/DL (CALC)
LYMPHOCYTES # BLD AUTO: 1550 CELLS/UL (ref 850–3900)
LYMPHOCYTES NFR BLD AUTO: 37.8 %
MCH RBC QN AUTO: 29.4 PG (ref 27–33)
MCHC RBC AUTO-ENTMCNC: 32.7 G/DL (ref 32–36)
MCV RBC AUTO: 89.7 FL (ref 80–100)
MONOCYTES # BLD AUTO: 484 CELLS/UL (ref 200–950)
MONOCYTES NFR BLD AUTO: 11.8 %
NEUTROPHILS # BLD AUTO: 1898 CELLS/UL (ref 1500–7800)
NEUTROPHILS NFR BLD AUTO: 46.3 %
NONHDLC SERPL-MCNC: 134 MG/DL (CALC)
PLATELET # BLD AUTO: 207 THOUSAND/UL (ref 140–400)
PMV BLD REES-ECKER: 11 FL (ref 7.5–12.5)
POTASSIUM SERPL-SCNC: 3.8 MMOL/L (ref 3.5–5.3)
PROT SERPL-MCNC: 7.2 G/DL (ref 6.1–8.1)
RBC # BLD AUTO: 4.29 MILLION/UL (ref 3.8–5.1)
SL AMB EGFR AFRICAN AMERICAN: 103 ML/MIN/1.73M2
SL AMB EGFR NON AFRICAN AMERICAN: 89 ML/MIN/1.73M2
SODIUM SERPL-SCNC: 140 MMOL/L (ref 135–146)
T4 FREE SERPL-MCNC: 1.5 NG/DL (ref 0.8–1.8)
TRIGL SERPL-MCNC: 89 MG/DL
TSH SERPL-ACNC: 0.64 MIU/L (ref 0.4–4.5)
WBC # BLD AUTO: 4.1 THOUSAND/UL (ref 3.8–10.8)

## 2021-07-21 ENCOUNTER — RA CDI HCC (OUTPATIENT)
Dept: OTHER | Facility: HOSPITAL | Age: 71
End: 2021-07-21

## 2021-07-21 NOTE — PROGRESS NOTES
[facility]  coding opportunities          Chart reviewed, no opportunity found: CHART REVIEWED, NO OPPORTUNITY FOUND                     Patients insurance company:  [facility] (Medicare Advantage and Commercial)

## 2021-07-28 ENCOUNTER — OFFICE VISIT (OUTPATIENT)
Dept: INTERNAL MEDICINE CLINIC | Facility: CLINIC | Age: 71
End: 2021-07-28
Payer: COMMERCIAL

## 2021-07-28 VITALS
TEMPERATURE: 98.9 F | DIASTOLIC BLOOD PRESSURE: 80 MMHG | HEART RATE: 91 BPM | HEIGHT: 66 IN | BODY MASS INDEX: 18.96 KG/M2 | OXYGEN SATURATION: 98 % | WEIGHT: 118 LBS | SYSTOLIC BLOOD PRESSURE: 160 MMHG

## 2021-07-28 DIAGNOSIS — R03.0 ELEVATED BLOOD PRESSURE READING IN OFFICE WITH WHITE COAT SYNDROME, WITHOUT DIAGNOSIS OF HYPERTENSION: ICD-10-CM

## 2021-07-28 DIAGNOSIS — K51.90 ULCERATIVE COLITIS WITHOUT COMPLICATIONS, UNSPECIFIED LOCATION (HCC): ICD-10-CM

## 2021-07-28 DIAGNOSIS — E89.0 POSTOPERATIVE HYPOTHYROIDISM: ICD-10-CM

## 2021-07-28 DIAGNOSIS — H61.23 IMPACTED CERUMEN OF BOTH EARS: ICD-10-CM

## 2021-07-28 DIAGNOSIS — E78.2 MIXED HYPERLIPIDEMIA: Primary | ICD-10-CM

## 2021-07-28 PROCEDURE — 1125F AMNT PAIN NOTED PAIN PRSNT: CPT | Performed by: INTERNAL MEDICINE

## 2021-07-28 PROCEDURE — 3288F FALL RISK ASSESSMENT DOCD: CPT | Performed by: INTERNAL MEDICINE

## 2021-07-28 PROCEDURE — G0439 PPPS, SUBSEQ VISIT: HCPCS | Performed by: INTERNAL MEDICINE

## 2021-07-28 PROCEDURE — 3008F BODY MASS INDEX DOCD: CPT | Performed by: INTERNAL MEDICINE

## 2021-07-28 PROCEDURE — 3725F SCREEN DEPRESSION PERFORMED: CPT | Performed by: INTERNAL MEDICINE

## 2021-07-28 PROCEDURE — 1170F FXNL STATUS ASSESSED: CPT | Performed by: INTERNAL MEDICINE

## 2021-07-28 PROCEDURE — 1160F RVW MEDS BY RX/DR IN RCRD: CPT | Performed by: INTERNAL MEDICINE

## 2021-07-28 PROCEDURE — 69210 REMOVE IMPACTED EAR WAX UNI: CPT | Performed by: INTERNAL MEDICINE

## 2021-07-28 PROCEDURE — 1036F TOBACCO NON-USER: CPT | Performed by: INTERNAL MEDICINE

## 2021-07-28 PROCEDURE — 1101F PT FALLS ASSESS-DOCD LE1/YR: CPT | Performed by: INTERNAL MEDICINE

## 2021-07-28 NOTE — ASSESSMENT & PLAN NOTE
Better from last year  With high BP here, her 10y ASCVD risk is >7 5%  Using normal BP readings she is getting at home around 120/70, it is 7 1%

## 2021-07-28 NOTE — ASSESSMENT & PLAN NOTE
Home readings are normal- 120/70s   I asked she schedule a BP check and bring her wrist cuff so we can make sure it is accurate

## 2021-07-28 NOTE — PATIENT INSTRUCTIONS
Medicare Preventive Visit Patient Instructions  Thank you for completing your Welcome to Medicare Visit or Medicare Annual Wellness Visit today  Your next wellness visit will be due in one year (7/29/2022)  The screening/preventive services that you may require over the next 5-10 years are detailed below  Some tests may not apply to you based off risk factors and/or age  Screening tests ordered at today's visit but not completed yet may show as past due  Also, please note that scanned in results may not display below  Preventive Screenings:  Service Recommendations Previous Testing/Comments   Colorectal Cancer Screening  * Colonoscopy    * Fecal Occult Blood Test (FOBT)/Fecal Immunochemical Test (FIT)  * Fecal DNA/Cologuard Test  * Flexible Sigmoidoscopy Age: 54-65 years old   Colonoscopy: every 10 years (may be performed more frequently if at higher risk)  OR  FOBT/FIT: every 1 year  OR  Cologuard: every 3 years  OR  Sigmoidoscopy: every 5 years  Screening may be recommended earlier than age 48 if at higher risk for colorectal cancer  Also, an individualized decision between you and your healthcare provider will decide whether screening between the ages of 74-80 would be appropriate  Colonoscopy: 09/13/2018  FOBT/FIT: Not on file  Cologuard: Not on file  Sigmoidoscopy: Not on file    Screening Current     Breast Cancer Screening Age: 36 years old  Frequency: every 1-2 years  Not required if history of left and right mastectomy Mammogram: 10/13/2020    Screening Current   Cervical Cancer Screening Between the ages of 21-29, pap smear recommended once every 3 years  Between the ages of 33-67, can perform pap smear with HPV co-testing every 5 years     Recommendations may differ for women with a history of total hysterectomy, cervical cancer, or abnormal pap smears in past  Pap Smear: 12/15/2020    Screening Not Indicated   Hepatitis C Screening Once for adults born between 1945 and 1965  More frequently in patients at high risk for Hepatitis C Hep C Antibody: 06/01/2017    Screening Current   Diabetes Screening 1-2 times per year if you're at risk for diabetes or have pre-diabetes Fasting glucose: No results in last 5 years   A1C: No results in last 5 years    Screening Current   Cholesterol Screening Once every 5 years if you don't have a lipid disorder  May order more often based on risk factors  Lipid panel: 06/25/2021    Screening Not Indicated  History Lipid Disorder     Other Preventive Screenings Covered by Medicare:  1  Abdominal Aortic Aneurysm (AAA) Screening: covered once if your at risk  You're considered to be at risk if you have a family history of AAA  2  Lung Cancer Screening: covers low dose CT scan once per year if you meet all of the following conditions: (1) Age 50-69; (2) No signs or symptoms of lung cancer; (3) Current smoker or have quit smoking within the last 15 years; (4) You have a tobacco smoking history of at least 30 pack years (packs per day multiplied by number of years you smoked); (5) You get a written order from a healthcare provider  3  Glaucoma Screening: covered annually if you're considered high risk: (1) You have diabetes OR (2) Family history of glaucoma OR (3)  aged 48 and older OR (3)  American aged 72 and older  3  Osteoporosis Screening: covered every 2 years if you meet one of the following conditions: (1) You're estrogen deficient and at risk for osteoporosis based off medical history and other findings; (2) Have a vertebral abnormality; (3) On glucocorticoid therapy for more than 3 months; (4) Have primary hyperparathyroidism; (5) On osteoporosis medications and need to assess response to drug therapy  · Last bone density test (DXA Scan): 10/13/2020   5  HIV Screening: covered annually if you're between the age of 15-65  Also covered annually if you are younger than 13 and older than 72 with risk factors for HIV infection   For pregnant patients, it is covered up to 3 times per pregnancy  Immunizations:  Immunization Recommendations   Influenza Vaccine Annual influenza vaccination during flu season is recommended for all persons aged >= 6 months who do not have contraindications   Pneumococcal Vaccine (Prevnar and Pneumovax)  * Prevnar = PCV13  * Pneumovax = PPSV23   Adults 25-60 years old: 1-3 doses may be recommended based on certain risk factors  Adults 72 years old: Prevnar (PCV13) vaccine recommended followed by Pneumovax (PPSV23) vaccine  If already received PPSV23 since turning 65, then PCV13 recommended at least one year after PPSV23 dose  Hepatitis B Vaccine 3 dose series if at intermediate or high risk (ex: diabetes, end stage renal disease, liver disease)   Tetanus (Td) Vaccine - COST NOT COVERED BY MEDICARE PART B Following completion of primary series, a booster dose should be given every 10 years to maintain immunity against tetanus  Td may also be given as tetanus wound prophylaxis  Tdap Vaccine - COST NOT COVERED BY MEDICARE PART B Recommended at least once for all adults  For pregnant patients, recommended with each pregnancy  Shingles Vaccine (Shingrix) - COST NOT COVERED BY MEDICARE PART B  2 shot series recommended in those aged 48 and above     Health Maintenance Due:      Topic Date Due    Breast Cancer Screening: Mammogram  10/13/2021    Colorectal Cancer Screening  09/13/2023    DXA SCAN  10/13/2025    Hepatitis C Screening  Completed     Immunizations Due:      Topic Date Due    Influenza Vaccine (1) 09/01/2021     Advance Directives   What are advance directives? Advance directives are legal documents that state your wishes and plans for medical care  These plans are made ahead of time in case you lose your ability to make decisions for yourself  Advance directives can apply to any medical decision, such as the treatments you want, and if you want to donate organs     What are the types of advance directives? There are many types of advance directives, and each state has rules about how to use them  You may choose a combination of any of the following:  · Living will: This is a written record of the treatment you want  You can also choose which treatments you do not want, which to limit, and which to stop at a certain time  This includes surgery, medicine, IV fluid, and tube feedings  · Durable power of  for healthcare [facility]): This is a written record that states who you want to make healthcare choices for you when you are unable to make them for yourself  This person, called a proxy, is usually a family member or a friend  You may choose more than 1 proxy  · Do not resuscitate (DNR) order:  A DNR order is used in case your heart stops beating or you stop breathing  It is a request not to have certain forms of treatment, such as CPR  A DNR order may be included in other types of advance directives  · Medical directive: This covers the care that you want if you are in a coma, near death, or unable to make decisions for yourself  You can list the treatments you want for each condition  Treatment may include pain medicine, surgery, blood transfusions, dialysis, IV or tube feedings, and a ventilator (breathing machine)  · Values history: This document has questions about your views, beliefs, and how you feel and think about life  This information can help others choose the care that you would choose  Why are advance directives important? An advance directive helps you control your care  Although spoken wishes may be used, it is better to have your wishes written down  Spoken wishes can be misunderstood, or not followed  Treatments may be given even if you do not want them  An advance directive may make it easier for your family to make difficult choices about your care        © Copyright [employer] 2018 Information is for End User's use only and may not be sold, redistributed or otherwise used for commercial purposes   All illustrations and images included in CareNotes® are the copyrighted property of A D A M , Inc  or [facility] Kristine Steen

## 2021-07-28 NOTE — PROGRESS NOTES
Assessment and Plan:     Problem List Items Addressed This Visit        Digestive    Ulcerative colitis without complications [facility])     Colonoscopy in Sept Dr Vikas Fischer            Endocrine    Postoperative hypothyroidism     Managed by endotion test due in May            Other    Hyperlipidemia - Primary     Better from last year  With high BP here, her 10y ASCVD risk is >7 5%  Using normal BP readings she is getting at home around 120/70, it is 7 1%           Relevant Orders    CBC and differential    Comprehensive metabolic panel    Lipid Panel with Direct LDL reflex    Elevated blood pressure reading in office with white coat syndrome, without diagnosis of hypertension     Home readings are normal- 120/70s   I asked she schedule a BP check and bring her wrist cuff so we can make sure it is accurate         Relevant Orders    CBC and differential    Comprehensive metabolic panel    Lipid Panel with Direct LDL reflex           Preventive health issues were discussed with patient, and age appropriate screening tests were ordered as noted in patient's After Visit Summary  Personalized health advice and appropriate referrals for health education or preventive services given if needed, as noted in patient's After Visit Summary       History of Present Illness:     Patient presents for Medicare Annual Wellness visit    Patient Care Team:  Lesley Contreras MD as PCP - Naya Condon MD as PCP - Endocrinology (Endocrinology)  MD Radha Barrett MD Ary Rua, MD Ephraim Simas, MD Bettina Lively, MD     Problem List:     Patient Active Problem List   Diagnosis    Medicare annual wellness visit, subsequent    Hyperlipidemia    Impaired fasting glucose    Osteopenia    Postoperative hypothyroidism    Papillary thyroid carcinoma ([address] )    Elevated blood pressure reading in office with white coat syndrome, without diagnosis of hypertension    Ulcerative colitis without complications [facility] [address] )      Past Medical and Surgical History:     Past Medical History:   Diagnosis Date    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[facility] )     last assessed 11/17/17    Seborrheic keratosis     last assessed 5/1/15, resolved 11/2/15    Stool guaiac positive     last assessed 11/19/14, resolved 11/2/15    Ulcerative colitis without complications [facility] )     Vaginal atrophy     last assessed 11/19/14, resolved 11/2/15     Past Surgical History:   Procedure Laterality Date    THYROIDECTOMY      last assessed 6/13/16    TOOTH EXTRACTION      last assessed 6/13/16      Family History:     Family History   Problem Relation Age of Onset    COPD Mother     Seizures Mother     Transient ischemic attack Mother         & cerebral infarction    Hypertension Father     Heart murmur Father     Heart disease Father         pacemaker placement    Breast cancer Sister 58        Stage 1 at diagnosis    Diabetes Maternal Grandmother     Cancer Family       Social History:     Social History     Socioeconomic History    Marital status:      Spouse name: None    Number of children: None    Years of education: None    Highest education level: None   Occupational History    None   Tobacco Use    Smoking status: Never Smoker    Smokeless tobacco: Never Used   Substance and Sexual Activity    Alcohol use: Yes     Comment: Minimum consumption     Drug use: No    Sexual activity: None   Other Topics Concern    None   Social History Narrative    Feels safe at home     Home environment domestic violence: denied      Social Determinants of Health     Financial Resource Strain:     Difficulty of Paying Living Expenses:    Food Insecurity:     Worried About Running Out of Food in the Last Year:     Ran Out of Food in the Last Year:    Transportation Needs:     Lack of Transportation (Medical):      Lack of Transportation (Non-Medical):    Physical Activity:     Days of Exercise per Week:     Minutes of Exercise per Session: Stress:     Feeling of Stress :    Social Connections:     Frequency of Communication with Friends and Family:     Frequency of Social Gatherings with Friends and Family:     Attends Adventism Services:     Active Member of Clubs or Organizations:     Attends Club or Organization Meetings:     Marital Status:    Intimate Partner Violence:     Fear of Current or Ex-Partner:     Emotionally Abused:     Physically Abused:     Sexually Abused:       Medications and Allergies:     Current Outpatient Medications   Medication Sig Dispense Refill    aspirin (ASPIRIN ADULT LOW STRENGTH) 81 mg EC tablet Take 1 tablet by mouth daily      Calcium Carbonate-Vit D-Min (CALTRATE 600+D PLUS MINERALS) 600-800 MG-UNIT TABS       Multiple Vitamins-Minerals (CENTRUM SILVER ULTRA WOMENS PO) Take 1 tablet by mouth daily      Synthroid 75 MCG tablet Take 1 tablet (75 mcg total) by mouth daily Take 1 tablet Monday-Friday and 1/2 tablet on Saturday 90 tablet 0     No current facility-administered medications for this visit  Allergies   Allergen Reactions    Mesalamine Hives and Rash     Annotation - [id number]:  Allergy is to Suppository      Immunizations:     Immunization History   Administered Date(s) Administered    DTaP 5 05/23/2008    Influenza Split High Dose Preservative Free IM 10/08/2016, 10/07/2017    Influenza, high dose seasonal 0 7 mL 10/13/2018, 10/12/2019, 09/29/2020    Pneumococcal Conjugate 13-Valent 09/16/2016    Pneumococcal Polysaccharide PPV23 08/20/2010, 09/19/2017    SARS-CoV-2 / COVID-19 mRNA IM ([employer]) 04/03/2021, 04/25/2021    Td (adult), adsorbed 10/01/1998, 05/23/2008    Tdap 06/29/2018    Zoster 06/22/2012    Zoster Vaccine Recombinant 11/11/2018, 03/01/2019      Health Maintenance:         Topic Date Due    Breast Cancer Screening: Mammogram  10/13/2021    Colorectal Cancer Screening  09/13/2023    DXA SCAN  10/13/2025    Hepatitis C Screening  Completed Topic Date Due    Influenza Vaccine (1) 09/01/2021      Medicare Health Risk Assessment:     /80   Pulse 91   Temp 98 9 °F (37 2 °C) (Temporal)   Ht 5' 6" (1 676 m)   Wt 53 5 kg (118 lb)   SpO2 98%   BMI 19 05 kg/m²      Jax Gallegos is here for her Subsequent Wellness visit  Health Risk Assessment:   Patient rates overall health as excellent  Patient feels that their physical health rating is same  Patient is very satisfied with their life  Eyesight was rated as same  Hearing was rated as same  Patient feels that their emotional and mental health rating is same  Patients states they are never, rarely angry  Patient states they are never, rarely unusually tired/fatigued  Pain experienced in the last 7 days has been some  Patient's pain rating has been 1/10  Patient states that she has experienced no weight loss or gain in last 6 months  Depression Screening:   PHQ-2 Score: 0      Fall Risk Screening: In the past year, patient has experienced: no history of falling in past year      Urinary Incontinence Screening:   Patient has not leaked urine accidently in the last six months  Home Safety:  Patient does not have trouble with stairs inside or outside of their home  Patient has working smoke alarms and has working carbon monoxide detector  Home safety hazards include: none  Nutrition:   Current diet is Regular  Medications:   Patient is currently taking over-the-counter supplements  OTC medications include: Multivitamin and Calcium  Patient is able to manage medications  Activities of Daily Living (ADLs)/Instrumental Activities of Daily Living (IADLs):   Walk and transfer into and out of bed and chair?: Yes  Dress and groom yourself?: Yes    Bathe or shower yourself?: Yes    Feed yourself?  Yes  Do your laundry/housekeeping?: Yes  Manage your money, pay your bills and track your expenses?: Yes  Make your own meals?: Yes    Do your own shopping?: Yes    Durable Medical Equipment Suppliers  none    Previous Hospitalizations:   Any hospitalizations or ED visits within the last 12 months?: No      Advance Care Planning:   Living will: Yes    Durable POA for healthcare: Yes    Advanced directive: Yes      Comments: Brought an updated copy of living will today    Cognitive Screening:   Provider or family/friend/caregiver concerned regarding cognition?: No    PREVENTIVE SCREENINGS      Cardiovascular Screening:    General: Screening Not Indicated and History Lipid Disorder      Diabetes Screening:     General: Screening Current      Colorectal Cancer Screening:     General: Screening Current      Breast Cancer Screening:     General: Screening Current      Cervical Cancer Screening:    General: Screening Not Indicated      Osteoporosis Screening:    General: Screening Current      Abdominal Aortic Aneurysm (AAA) Screening:        General: Screening Not Indicated      Lung Cancer Screening:     General: Screening Not Indicated      Hepatitis C Screening:    General: Screening Current      Preventive Screening Comments: Colonoscopy in   (Dr Ken Nichols)     Screening, Brief Intervention, and Referral to Treatment (SBIRT)    Screening  Typical number of drinks in a day: 0  Typical number of drinks in a week: 0  Interpretation: Low risk drinking behavior  AUDIT-C Screenin) How often did you have a drink containing alcohol in the past year? monthly or less  2) How many drinks did you have on a typical day when you were drinking in the past year?  1 to 2  3) How often did you have 6 or more drinks on one occasion in the past year? never    AUDIT-C Score: 1  Interpretation: Score 0-2 (female): Negative screen for alcohol misuse    Single Item Drug Screening:  How often have you used an illegal drug (including marijuana) or a prescription medication for non-medical reasons in the past year? never    Single Item Drug Screen Score: 0  Interpretation: Negative screen for possible drug use disorder    Review of Systems   Constitutional: Negative for chills, fever and unexpected weight change  HENT: Negative for rhinorrhea  Fullness in the ears   Respiratory: Negative for cough and shortness of breath  Cardiovascular: Negative for chest pain and palpitations  Gastrointestinal: Positive for anal bleeding (with wiping)  Negative for blood in stool  Genitourinary: Negative for difficulty urinating  Musculoskeletal: Negative for arthralgias and myalgias  Neurological: Negative for dizziness and headaches  Physical Exam  Constitutional:       General: She is not in acute distress  Appearance: She is well-developed  She is not ill-appearing, toxic-appearing or diaphoretic  HENT:      Head: Normocephalic and atraumatic  Right Ear: External ear normal  There is impacted cerumen  Left Ear: External ear normal  There is impacted cerumen  Eyes:      Conjunctiva/sclera: Conjunctivae normal    Cardiovascular:      Rate and Rhythm: Normal rate and regular rhythm  Heart sounds: Normal heart sounds  No murmur heard  Pulmonary:      Effort: Pulmonary effort is normal  No respiratory distress  Breath sounds: Normal breath sounds  No stridor  No wheezing or rales  Abdominal:      General: There is no distension  Palpations: Abdomen is soft  There is no mass  Tenderness: There is no abdominal tenderness  There is no guarding or rebound  Musculoskeletal:      Cervical back: Neck supple  Right lower leg: No edema  Left lower leg: No edema  Skin:     General: Skin is warm and dry  Neurological:      Mental Status: She is alert and oriented to person, place, and time  Psychiatric:         Behavior: Behavior normal          Thought Content:  Thought content normal          Judgment: Judgment normal        Ear cerumen removal    Date/Time: 7/28/2021 11:35 AM  Performed by: Murlene Boas, MD  Authorized by: Murlene Boas, MD     Patient location: Clinic  Procedure details:     Location:  L ear and R ear    Procedure type: curette    Post-procedure details:     Complication:  None    Hearing quality:  Improved    Patient tolerance of procedure:   Tolerated well, no immediate complications      Radha Odom MD

## 2021-07-30 ENCOUNTER — CLINICAL SUPPORT (OUTPATIENT)
Dept: INTERNAL MEDICINE CLINIC | Facility: CLINIC | Age: 71
End: 2021-07-30

## 2021-07-30 VITALS — DIASTOLIC BLOOD PRESSURE: 76 MMHG | SYSTOLIC BLOOD PRESSURE: 150 MMHG

## 2021-07-30 DIAGNOSIS — Z01.30 BLOOD PRESSURE CHECK: Primary | ICD-10-CM

## 2021-09-21 ENCOUNTER — VBI (OUTPATIENT)
Dept: ADMINISTRATIVE | Facility: OTHER | Age: 71
End: 2021-09-21

## 2021-10-02 ENCOUNTER — IMMUNIZATIONS (OUTPATIENT)
Dept: INTERNAL MEDICINE CLINIC | Facility: CLINIC | Age: 71
End: 2021-10-02
Payer: COMMERCIAL

## 2021-10-02 DIAGNOSIS — Z23 ENCOUNTER FOR IMMUNIZATION: Primary | ICD-10-CM

## 2021-10-02 PROCEDURE — G0008 ADMIN INFLUENZA VIRUS VAC: HCPCS

## 2021-10-02 PROCEDURE — 90662 IIV NO PRSV INCREASED AG IM: CPT

## 2021-10-04 DIAGNOSIS — C73 PAPILLARY THYROID CARCINOMA (HCC): ICD-10-CM

## 2021-10-04 RX ORDER — LEVOTHYROXINE SODIUM 75 MCG
75 TABLET ORAL DAILY
Qty: 90 TABLET | Refills: 0 | Status: SHIPPED | OUTPATIENT
Start: 2021-10-04 | End: 2022-02-17 | Stop reason: SDUPTHER

## 2021-10-30 ENCOUNTER — IMMUNIZATIONS (OUTPATIENT)
Dept: FAMILY MEDICINE CLINIC | Facility: HOSPITAL | Age: 71
End: 2021-10-30

## 2021-10-30 DIAGNOSIS — Z23 ENCOUNTER FOR IMMUNIZATION: Primary | ICD-10-CM

## 2021-10-30 PROCEDURE — 0001A COVID-19 PFIZER VACC 0.3 ML: CPT

## 2021-10-30 PROCEDURE — 91300 COVID-19 PFIZER VACC 0.3 ML: CPT

## 2021-12-16 ENCOUNTER — TRANSCRIBE ORDERS (OUTPATIENT)
Dept: OBGYN CLINIC | Facility: CLINIC | Age: 71
End: 2021-12-16

## 2021-12-16 ENCOUNTER — ANNUAL EXAM (OUTPATIENT)
Dept: OBGYN CLINIC | Facility: CLINIC | Age: 71
End: 2021-12-16
Payer: COMMERCIAL

## 2021-12-16 VITALS
HEIGHT: 66 IN | SYSTOLIC BLOOD PRESSURE: 182 MMHG | DIASTOLIC BLOOD PRESSURE: 84 MMHG | WEIGHT: 122 LBS | BODY MASS INDEX: 19.61 KG/M2

## 2021-12-16 DIAGNOSIS — Z78.0 OSTEOPENIA AFTER MENOPAUSE: Primary | ICD-10-CM

## 2021-12-16 DIAGNOSIS — Z12.31 ENCOUNTER FOR SCREENING MAMMOGRAM FOR MALIGNANT NEOPLASM OF BREAST: Primary | ICD-10-CM

## 2021-12-16 DIAGNOSIS — N95.2 ATROPHIC VAGINITIS: ICD-10-CM

## 2021-12-16 DIAGNOSIS — Z01.419 ENCOUNTER FOR GYNECOLOGICAL EXAMINATION WITHOUT ABNORMAL FINDING: ICD-10-CM

## 2021-12-16 DIAGNOSIS — M85.80 OSTEOPENIA AFTER MENOPAUSE: Primary | ICD-10-CM

## 2021-12-16 PROCEDURE — S0612 ANNUAL GYNECOLOGICAL EXAMINA: HCPCS | Performed by: OBSTETRICS & GYNECOLOGY

## 2021-12-21 ENCOUNTER — TELEPHONE (OUTPATIENT)
Dept: OBGYN CLINIC | Facility: CLINIC | Age: 71
End: 2021-12-21

## 2022-02-17 DIAGNOSIS — C73 PAPILLARY THYROID CARCINOMA (HCC): ICD-10-CM

## 2022-02-17 RX ORDER — LEVOTHYROXINE SODIUM 75 MCG
75 TABLET ORAL DAILY
Qty: 90 TABLET | Refills: 0 | Status: SHIPPED | OUTPATIENT
Start: 2022-02-17 | End: 2022-06-16 | Stop reason: SDUPTHER

## 2022-04-05 ENCOUNTER — ESTABLISHED COMPREHENSIVE EXAM (OUTPATIENT)
Dept: URBAN - METROPOLITAN AREA CLINIC 6 | Facility: CLINIC | Age: 72
End: 2022-04-05

## 2022-04-05 DIAGNOSIS — H40.033: ICD-10-CM

## 2022-04-05 DIAGNOSIS — H25.813: ICD-10-CM

## 2022-04-05 PROCEDURE — 92014 COMPRE OPH EXAM EST PT 1/>: CPT

## 2022-04-05 ASSESSMENT — VISUAL ACUITY
OS_GLARE: 20/400
OS_CC: 20/30-2
OD_GLARE: 20/400
OD_CC: 20/30-2

## 2022-04-05 ASSESSMENT — TONOMETRY
OD_IOP_MMHG: 18
OS_IOP_MMHG: 15

## 2022-05-15 LAB
ALBUMIN SERPL-MCNC: 4.3 G/DL (ref 3.6–5.1)
ALBUMIN/GLOB SERPL: 1.5 (CALC) (ref 1–2.5)
ALP SERPL-CCNC: 53 U/L (ref 37–153)
ALT SERPL-CCNC: 15 U/L (ref 6–29)
AST SERPL-CCNC: 27 U/L (ref 10–35)
BASOPHILS # BLD AUTO: 30 CELLS/UL (ref 0–200)
BASOPHILS NFR BLD AUTO: 0.8 %
BILIRUB SERPL-MCNC: 1.1 MG/DL (ref 0.2–1.2)
BUN SERPL-MCNC: 15 MG/DL (ref 7–25)
BUN/CREAT SERPL: NORMAL (CALC) (ref 6–22)
CALCIUM SERPL-MCNC: 9.3 MG/DL (ref 8.6–10.4)
CHLORIDE SERPL-SCNC: 102 MMOL/L (ref 98–110)
CHOLEST SERPL-MCNC: 234 MG/DL
CHOLEST/HDLC SERPL: 3.1 (CALC)
CO2 SERPL-SCNC: 31 MMOL/L (ref 20–32)
CREAT SERPL-MCNC: 0.71 MG/DL (ref 0.6–0.93)
EOSINOPHIL # BLD AUTO: 152 CELLS/UL (ref 15–500)
EOSINOPHIL NFR BLD AUTO: 4 %
ERYTHROCYTE [DISTWIDTH] IN BLOOD BY AUTOMATED COUNT: 12.3 % (ref 11–15)
GLOBULIN SER CALC-MCNC: 2.9 G/DL (CALC) (ref 1.9–3.7)
GLUCOSE SERPL-MCNC: 96 MG/DL (ref 65–99)
HCT VFR BLD AUTO: 39.2 % (ref 35–45)
HDLC SERPL-MCNC: 75 MG/DL
HGB BLD-MCNC: 12.7 G/DL (ref 11.7–15.5)
LDLC SERPL CALC-MCNC: 139 MG/DL (CALC)
LYMPHOCYTES # BLD AUTO: 1254 CELLS/UL (ref 850–3900)
LYMPHOCYTES NFR BLD AUTO: 33 %
MCH RBC QN AUTO: 30 PG (ref 27–33)
MCHC RBC AUTO-ENTMCNC: 32.4 G/DL (ref 32–36)
MCV RBC AUTO: 92.7 FL (ref 80–100)
MONOCYTES # BLD AUTO: 452 CELLS/UL (ref 200–950)
MONOCYTES NFR BLD AUTO: 11.9 %
NEUTROPHILS # BLD AUTO: 1911 CELLS/UL (ref 1500–7800)
NEUTROPHILS NFR BLD AUTO: 50.3 %
NONHDLC SERPL-MCNC: 159 MG/DL (CALC)
PLATELET # BLD AUTO: 212 THOUSAND/UL (ref 140–400)
PMV BLD REES-ECKER: 9.6 FL (ref 7.5–12.5)
POTASSIUM SERPL-SCNC: 3.8 MMOL/L (ref 3.5–5.3)
PROT SERPL-MCNC: 7.2 G/DL (ref 6.1–8.1)
RBC # BLD AUTO: 4.23 MILLION/UL (ref 3.8–5.1)
SL AMB EGFR AFRICAN AMERICAN: 100 ML/MIN/1.73M2
SL AMB EGFR NON AFRICAN AMERICAN: 86 ML/MIN/1.73M2
SODIUM SERPL-SCNC: 141 MMOL/L (ref 135–146)
TRIGL SERPL-MCNC: 97 MG/DL
WBC # BLD AUTO: 3.8 THOUSAND/UL (ref 3.8–10.8)

## 2022-05-16 DIAGNOSIS — E78.2 MIXED HYPERLIPIDEMIA: Primary | ICD-10-CM

## 2022-05-16 LAB
T4 FREE SERPL-MCNC: 1.5 NG/DL (ref 0.8–1.8)
THYROGLOB AB SERPL-ACNC: <0.1 NG/ML
THYROGLOB AB SERPL-ACNC: <1 IU/ML
TSH SERPL-ACNC: 0.76 MIU/L (ref 0.4–4.5)

## 2022-05-28 ENCOUNTER — HOSPITAL ENCOUNTER (OUTPATIENT)
Dept: RADIOLOGY | Facility: HOSPITAL | Age: 72
Discharge: HOME | End: 2022-05-28
Payer: COMMERCIAL

## 2022-05-28 DIAGNOSIS — E78.2 MIXED HYPERLIPIDEMIA: ICD-10-CM

## 2022-05-28 PROCEDURE — 75571 CT HRT W/O DYE W/CA TEST: CPT

## 2022-05-28 PROCEDURE — G1004 CDSM NDSC: HCPCS

## 2022-05-31 DIAGNOSIS — E78.2 MIXED HYPERLIPIDEMIA: Primary | ICD-10-CM

## 2022-05-31 RX ORDER — ATORVASTATIN CALCIUM 20 MG/1
20 TABLET, FILM COATED ORAL DAILY
Qty: 90 TABLET | Refills: 1 | Status: SHIPPED | OUTPATIENT
Start: 2022-05-31 | End: 2022-11-03 | Stop reason: SDUPTHER

## 2022-06-16 DIAGNOSIS — C73 PAPILLARY THYROID CARCINOMA (HCC): ICD-10-CM

## 2022-06-17 RX ORDER — LEVOTHYROXINE SODIUM 75 MCG
TABLET ORAL
Qty: 90 TABLET | Refills: 0 | Status: SHIPPED | OUTPATIENT
Start: 2022-06-17 | End: 2022-07-20 | Stop reason: SDUPTHER

## 2022-06-29 ENCOUNTER — RA CDI HCC (OUTPATIENT)
Dept: OTHER | Facility: HOSPITAL | Age: 72
End: 2022-06-29

## 2022-06-29 NOTE — PROGRESS NOTES
[facility]  coding opportunities       Chart reviewed, no opportunity found:   [address]        Patients Insurance     Medicare Insurance: [facility]

## 2022-07-20 ENCOUNTER — OFFICE VISIT (OUTPATIENT)
Dept: ENDOCRINOLOGY | Facility: CLINIC | Age: 72
End: 2022-07-20
Payer: COMMERCIAL

## 2022-07-20 VITALS
DIASTOLIC BLOOD PRESSURE: 82 MMHG | BODY MASS INDEX: 18.71 KG/M2 | WEIGHT: 116.4 LBS | HEIGHT: 66 IN | HEART RATE: 102 BPM | SYSTOLIC BLOOD PRESSURE: 160 MMHG

## 2022-07-20 DIAGNOSIS — C73 PAPILLARY THYROID CARCINOMA (HCC): ICD-10-CM

## 2022-07-20 DIAGNOSIS — M85.80 OSTEOPENIA, UNSPECIFIED LOCATION: ICD-10-CM

## 2022-07-20 DIAGNOSIS — R73.01 IMPAIRED FASTING GLUCOSE: ICD-10-CM

## 2022-07-20 DIAGNOSIS — E89.0 POSTOPERATIVE HYPOTHYROIDISM: Primary | ICD-10-CM

## 2022-07-20 PROCEDURE — 1160F RVW MEDS BY RX/DR IN RCRD: CPT | Performed by: PHYSICIAN ASSISTANT

## 2022-07-20 PROCEDURE — 99214 OFFICE O/P EST MOD 30 MIN: CPT | Performed by: PHYSICIAN ASSISTANT

## 2022-07-20 RX ORDER — LEVOTHYROXINE SODIUM 75 MCG
TABLET ORAL
Qty: 90 TABLET | Refills: 3 | Status: SHIPPED | OUTPATIENT
Start: 2022-07-20

## 2022-07-20 NOTE — PROGRESS NOTES
Established Patient Progress Note       Chief Complaint   Patient presents with    Thyroid Cancer    Hypothyroidism        History of Present Illness:     Ella Granger is a 80 y o  female with a history of Thyroid Cancer, Hypothyroidism, and Osteopenia  Reports history of white coat HTN and she brought home BP long and blood pressure readings at home have been normal       For the thyroid cancer, she had thyroidectomy in 2020 followed by I-131 therapy  There has been no recurrence  For Hypothyroidism, taking Synthroid 88mcg- 1 tab Mon-Fri, 1/2 tab sat, 0 on Sunday  Overall, feeling well with no complaints  For Osteopenia, taking Calcium and Vitamin D supplement  Denies fractures  Follows with OB/GYN and has DEXA/Follow up in the Fall         Patient Active Problem List   Diagnosis    Medicare annual wellness visit, subsequent    Hyperlipidemia    Impaired fasting glucose    Osteopenia    Postoperative hypothyroidism    Papillary thyroid carcinoma ([facility] )    Elevated blood pressure reading in office with white coat syndrome, without diagnosis of hypertension    Ulcerative colitis without complications ([facility] [address] )      Past Medical History:   Diagnosis Date    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[facility] )     last assessed 11/17/17    Seborrheic keratosis     last assessed 5/1/15, resolved 11/2/15    Stool guaiac positive     last assessed 11/19/14, resolved 11/2/15    Ulcerative colitis without complications ([facility] )     Vaginal atrophy     last assessed 11/19/14, resolved 11/2/15      Past Surgical History:   Procedure Laterality Date    THYROIDECTOMY      last assessed 6/13/16    TOOTH EXTRACTION      last assessed 6/13/16      Family History   Problem Relation Age of Onset    COPD Mother     Seizures Mother     Transient ischemic attack Mother         & cerebral infarction    Hypertension Father     Heart murmur Father     Heart disease Father         pacemaker placement    Breast cancer Sister 58        Stage 1 at diagnosis    Diabetes Maternal Grandmother     Cancer Family      Social History     Tobacco Use    Smoking status: Never Smoker    Smokeless tobacco: Never Used   Substance Use Topics    Alcohol use: Yes     Comment: Minimum consumption      Allergies   Allergen Reactions    Mesalamine Hives and Rash     Annotation - [id number]: Allergy is to Suppository       Current Outpatient Medications:     aspirin (ECOTRIN LOW STRENGTH) 81 mg EC tablet, Take 1 tablet by mouth daily, Disp: , Rfl:     atorvastatin (LIPITOR) 20 mg tablet, Take 1 tablet (20 mg total) by mouth daily, Disp: 90 tablet, Rfl: 1    Calcium Carbonate-Vit D-Min (CALTRATE 600+D PLUS MINERALS) 600-800 MG-UNIT TABS, , Disp: , Rfl:     Multiple Vitamins-Minerals (CENTRUM SILVER ULTRA WOMENS PO), Take 1 tablet by mouth daily, Disp: , Rfl:     Synthroid 75 MCG tablet, Take 1 tablet Monday-Friday and 1/2 tablet on Saturday, Disp: 90 tablet, Rfl: 3    Review of Systems   Constitutional: Negative for activity change, appetite change, chills, diaphoresis, fatigue, fever and unexpected weight change  HENT: Negative for trouble swallowing and voice change  Eyes: Negative for visual disturbance  Respiratory: Negative for shortness of breath  Cardiovascular: Negative for chest pain and palpitations  Gastrointestinal: Negative for abdominal pain, constipation and diarrhea  Endocrine: Negative for cold intolerance, heat intolerance, polydipsia, polyphagia and polyuria  Genitourinary: Negative for frequency and menstrual problem  Musculoskeletal: Negative for arthralgias and myalgias  Skin: Negative for rash  Allergic/Immunologic: Negative for food allergies  Neurological: Negative for dizziness and tremors  Hematological: Negative for adenopathy  Psychiatric/Behavioral: Negative for sleep disturbance  All other systems reviewed and are negative        Physical Exam:  Body mass index is 18 79 kg/m²   /82   Pulse 102   Ht 5' 6" (1 676 m)   Wt 52 8 kg (116 lb 6 4 oz)   BMI 18 79 kg/m²    Wt Readings from Last 3 Encounters:   07/20/22 52 8 kg (116 lb 6 4 oz)   12/16/21 55 3 kg (122 lb)   07/28/21 53 5 kg (118 lb)       Physical Exam  Vitals reviewed  Constitutional:       General: She is not in acute distress  Appearance: She is well-developed  HENT:      Head: Normocephalic and atraumatic  Eyes:      Conjunctiva/sclera: Conjunctivae normal       Pupils: Pupils are equal, round, and reactive to light  Neck:      Thyroid: No thyromegaly  Cardiovascular:      Rate and Rhythm: Normal rate and regular rhythm  Heart sounds: Normal heart sounds  Pulmonary:      Effort: Pulmonary effort is normal  No respiratory distress  Breath sounds: Normal breath sounds  No wheezing or rales  Abdominal:      General: Bowel sounds are normal  There is no distension  Palpations: Abdomen is soft  Tenderness: There is no abdominal tenderness  Musculoskeletal:         General: Normal range of motion  Cervical back: Normal range of motion and neck supple  Skin:     General: Skin is warm and dry  Neurological:      Mental Status: She is alert and oriented to person, place, and time           Labs:     Component      Latest Ref Rng & Units 5/10/2021 6/25/2021 6/25/2021 6/25/2021           8:34 AM  8:24 AM  8:26 AM  8:26 AM   Glucose, Random      65 - 99 mg/dL       BUN      7 - 25 mg/dL       Creatinine      0 60 - 0 93 mg/dL       eGFR Non       > OR = 60 mL/min/1 73m2       eGFR       > OR = 60 mL/min/1 73m2       SL AMB BUN/CREATININE RATIO      6 - 22 (calc)       Sodium      135 - 146 mmol/L       Potassium      3 5 - 5 3 mmol/L       Chloride      98 - 110 mmol/L       CO2      20 - 32 mmol/L       Calcium      8 6 - 10 4 mg/dL       Total Protein      6 1 - 8 1 g/dL       Albumin      3 6 - 5 1 g/dL       Globulin      1 9 - 3 7 g/dL (calc)       Albumin/Globulin Ratio      1 0 - 2 5 (calc)       TOTAL BILIRUBIN      0 2 - 1 2 mg/dL       Alkaline Phosphatase      37 - 153 U/L       AST      10 - 35 U/L       ALT      6 - 29 U/L       THYROGLOBULIN AB      < or = 1 IU/mL <1      THYROGLOBULIN      ng/mL <0 1 (L)      EXTERNAL VITAMIN D,25      30 - 100 ng/mL  48     Free T4      0 8 - 1 8 ng/dL   1 5    TSH, POC      0 40 - 4 50 mIU/L    0 64     Component      Latest Ref Rng & Units 5/14/2022 5/14/2022 5/14/2022           9:03 AM  9:05 AM  9:05 AM   Glucose, Random      65 - 99 mg/dL 96     BUN      7 - 25 mg/dL 15     Creatinine      0 60 - 0 93 mg/dL 0 71     eGFR Non       > OR = 60 mL/min/1 73m2 86     eGFR       > OR = 60 mL/min/1 73m2 100     SL AMB BUN/CREATININE RATIO      6 - 22 (calc) NOT APPLICABLE     Sodium      135 - 146 mmol/L 141     Potassium      3 5 - 5 3 mmol/L 3 8     Chloride      98 - 110 mmol/L 102     CO2      20 - 32 mmol/L 31     Calcium      8 6 - 10 4 mg/dL 9 3     Total Protein      6 1 - 8 1 g/dL 7 2     Albumin      3 6 - 5 1 g/dL 4 3     Globulin      1 9 - 3 7 g/dL (calc) 2 9     Albumin/Globulin Ratio      1 0 - 2 5 (calc) 1 5     TOTAL BILIRUBIN      0 2 - 1 2 mg/dL 1 1     Alkaline Phosphatase      37 - 153 U/L 53     AST      10 - 35 U/L 27     ALT      6 - 29 U/L 15     THYROGLOBULIN AB      < or = 1 IU/mL      THYROGLOBULIN      ng/mL      EXTERNAL VITAMIN D,25      30 - 100 ng/mL      Free T4      0 8 - 1 8 ng/dL  1 5    TSH, POC      0 40 - 4 50 mIU/L   0 76     Component      Latest Ref Rng & Units 5/14/2022           9:05 AM   Glucose, Random      65 - 99 mg/dL    BUN      7 - 25 mg/dL    Creatinine      0 60 - 0 93 mg/dL    eGFR Non       > OR = 60 mL/min/1 73m2    eGFR       > OR = 60 mL/min/1 73m2    SL AMB BUN/CREATININE RATIO      6 - 22 (calc)    Sodium      135 - 146 mmol/L    Potassium      3 5 - 5 3 mmol/L    Chloride      98 - 110 mmol/L    CO2      20 - 32 mmol/L    Calcium      8 6 - 10 4 mg/dL    Total Protein      6 1 - 8 1 g/dL    Albumin      3 6 - 5 1 g/dL    Globulin      1 9 - 3 7 g/dL (calc)    Albumin/Globulin Ratio      1 0 - 2 5 (calc)    TOTAL BILIRUBIN      0 2 - 1 2 mg/dL    Alkaline Phosphatase      37 - 153 U/L    AST      10 - 35 U/L    ALT      6 - 29 U/L    THYROGLOBULIN AB      < or = 1 IU/mL <1   THYROGLOBULIN      ng/mL <0 1 (L)   EXTERNAL VITAMIN D,25      30 - 100 ng/mL    Free T4      0 8 - 1 8 ng/dL    TSH, POC      0 40 - 4 50 mIU/L        Impression & Plan:    Problem List Items Addressed This Visit        Endocrine    Impaired fasting glucose     This has improved, fasting glucose normal           Postoperative hypothyroidism - Primary     TSH at goal in low normal range  Continue Synthroid at current dose  Relevant Medications    Synthroid 75 MCG tablet    Papillary thyroid carcinoma (HCC)     No evidence of recurrence  Will continue to monitor thyroglobulin panel on a yearly basis  Relevant Medications    Synthroid 75 MCG tablet    Other Relevant Orders    TSH, 3rd generation    T4, free    Thyroglobulin       Musculoskeletal and Integument    Osteopenia     She is following with OB/GYN and has DEXA scan scheduled in the fall  She denies fractures  Continue Calcium and Vitamin D supplement  Orders Placed This Encounter   Procedures    TSH, 3rd generation     This is a patient instruction: This test is non-fasting  Please drink two glasses of water morning of bloodwork  Standing Status:   Future     Standing Expiration Date:   1/20/2024    T4, free     Standing Status:   Future     Standing Expiration Date:   1/20/2024    Thyroglobulin     Standing Status:   Future     Standing Expiration Date:   1/20/2024       There are no Patient Instructions on file for this visit  Discussed with the patient and all questioned fully answered   She will call me if any problems arise  Follow-up appointment in 12 months       Counseled patient on diagnostic results, prognosis, risk and benefit of treatment options, instruction for management, importance of treatment compliance, Risk  factor reduction and impressions      Roxana Hampton PA-C

## 2022-07-20 NOTE — ASSESSMENT & PLAN NOTE
She is following with OB/GYN and has DEXA scan scheduled in the fall  She denies fractures  Continue Calcium and Vitamin D supplement

## 2022-08-01 ENCOUNTER — OFFICE VISIT (OUTPATIENT)
Dept: INTERNAL MEDICINE CLINIC | Facility: CLINIC | Age: 72
End: 2022-08-01
Payer: COMMERCIAL

## 2022-08-01 VITALS
OXYGEN SATURATION: 98 % | RESPIRATION RATE: 16 BRPM | DIASTOLIC BLOOD PRESSURE: 88 MMHG | WEIGHT: 114.4 LBS | BODY MASS INDEX: 18.39 KG/M2 | SYSTOLIC BLOOD PRESSURE: 154 MMHG | HEIGHT: 66 IN | HEART RATE: 81 BPM

## 2022-08-01 DIAGNOSIS — E78.2 MIXED HYPERLIPIDEMIA: ICD-10-CM

## 2022-08-01 DIAGNOSIS — K51.90 ULCERATIVE COLITIS WITHOUT COMPLICATIONS, UNSPECIFIED LOCATION (HCC): ICD-10-CM

## 2022-08-01 DIAGNOSIS — R03.0 ELEVATED BLOOD PRESSURE READING IN OFFICE WITH WHITE COAT SYNDROME, WITHOUT DIAGNOSIS OF HYPERTENSION: ICD-10-CM

## 2022-08-01 DIAGNOSIS — Z00.00 MEDICARE ANNUAL WELLNESS VISIT, SUBSEQUENT: Primary | ICD-10-CM

## 2022-08-01 PROCEDURE — 3288F FALL RISK ASSESSMENT DOCD: CPT | Performed by: INTERNAL MEDICINE

## 2022-08-01 PROCEDURE — 1160F RVW MEDS BY RX/DR IN RCRD: CPT | Performed by: INTERNAL MEDICINE

## 2022-08-01 PROCEDURE — G0439 PPPS, SUBSEQ VISIT: HCPCS | Performed by: INTERNAL MEDICINE

## 2022-08-01 PROCEDURE — 3725F SCREEN DEPRESSION PERFORMED: CPT | Performed by: INTERNAL MEDICINE

## 2022-08-01 PROCEDURE — 1125F AMNT PAIN NOTED PAIN PRSNT: CPT | Performed by: INTERNAL MEDICINE

## 2022-08-01 PROCEDURE — 1170F FXNL STATUS ASSESSED: CPT | Performed by: INTERNAL MEDICINE

## 2022-08-01 NOTE — PROGRESS NOTES
Assessment and Plan:     Problem List Items Addressed This Visit        Digestive    Ulcerative colitis without complications [facility])     She has been in remission for years  Colonoscopy performed in Sept 2021            Other    Medicare annual wellness visit, subsequent - Primary    Hyperlipidemia     Blood work at the end of August/September to see how much lipids improve on atorvastatin  She is tolerating it well         Relevant Orders    CBC and Platelet    Comprehensive metabolic panel    Lipid Panel with Direct LDL reflex    Elevated blood pressure reading in office with white coat syndrome, without diagnosis of hypertension     BP log reviewed and normal             BMI Counseling: Body mass index is 18 46 kg/m²  The BMI is below normal  Patient advised to gain weight  Rationale for BMI follow-up plan is due to patient being underweight  Depression Screening and Follow-up Plan: Patient was screened for depression during today's encounter  They screened negative with a PHQ-2 score of 0  Preventive health issues were discussed with patient, and age appropriate screening tests were ordered as noted in patient's After Visit Summary  Personalized health advice and appropriate referrals for health education or preventive services given if needed, as noted in patient's After Visit Summary  History of Present Illness:     Patient presents for a Medicare Wellness Visit    HPI   Patient Care Team:  Lendia Cheadle, MD as PCP - Mary Ellen Haley MD as PCP - Endocrinology (Endocrinology)  MD Terry Pretty MD Merrilyn Dillon, MD Marval Marseilles, MD Doyne Pastel, MD     Review of Systems:     Review of Systems   Constitutional: Negative for chills, fever and unexpected weight change  HENT: Negative for sore throat  Respiratory: Negative for cough and shortness of breath  Cardiovascular: Negative for chest pain, palpitations and leg swelling     Gastrointestinal: Negative for abdominal pain, blood in stool, constipation, diarrhea, nausea and vomiting  Genitourinary: Negative for difficulty urinating, hematuria and vaginal bleeding  Musculoskeletal: Negative for arthralgias and myalgias  Neurological: Negative for dizziness and headaches          Problem List:     Patient Active Problem List   Diagnosis    Medicare annual wellness visit, subsequent    Hyperlipidemia    Impaired fasting glucose    Osteopenia    Postoperative hypothyroidism    Papillary thyroid carcinoma ([place] [address] )    Elevated blood pressure reading in office with white coat syndrome, without diagnosis of hypertension    Ulcerative colitis without complications [facility])      Past Medical and Surgical History:     Past Medical History:   Diagnosis Date    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[address] )     last assessed 11/17/17    Seborrheic keratosis     last assessed 5/1/15, resolved 11/2/15    Stool guaiac positive     last assessed 11/19/14, resolved 11/2/15    Ulcerative colitis without complications ([place] [address] )     Vaginal atrophy     last assessed 11/19/14, resolved 11/2/15     Past Surgical History:   Procedure Laterality Date    THYROIDECTOMY      last assessed 6/13/16    TOOTH EXTRACTION      last assessed 6/13/16      Family History:     Family History   Problem Relation Age of Onset    COPD Mother     Seizures Mother     Transient ischemic attack Mother         & cerebral infarction    Hypertension Father     Heart murmur Father     Heart disease Father         pacemaker placement    Breast cancer Sister 58        Stage 1 at diagnosis    Diabetes Maternal Grandmother     Cancer Family       Social History:     Social History     Socioeconomic History    Marital status:      Spouse name: None    Number of children: None    Years of education: None    Highest education level: None   Occupational History    None   Tobacco Use    Smoking status: Never Smoker    Smokeless tobacco: Never Used Substance and Sexual Activity    Alcohol use: Yes     Comment: Minimum consumption     Drug use: No    Sexual activity: None   Other Topics Concern    None   Social History Narrative    Feels safe at home     Home environment domestic violence: denied      Social Determinants of Health     Financial Resource Strain: Not on file   Food Insecurity: Not on file   Transportation Needs: Not on file   Physical Activity: Not on file   Stress: Not on file   Social Connections: Not on file   Intimate Partner Violence: Not on file   Housing Stability: Not on file      Medications and Allergies:     Current Outpatient Medications   Medication Sig Dispense Refill    aspirin (ECOTRIN LOW STRENGTH) 81 mg EC tablet Take 1 tablet by mouth daily      atorvastatin (LIPITOR) 20 mg tablet Take 1 tablet (20 mg total) by mouth daily 90 tablet 1    Calcium Carbonate-Vit D-Min (CALTRATE 600+D PLUS MINERALS) 600-800 MG-UNIT TABS       Multiple Vitamins-Minerals (CENTRUM SILVER ULTRA WOMENS PO) Take 1 tablet by mouth daily      Synthroid 75 MCG tablet Take 1 tablet Monday-Friday and 1/2 tablet on Saturday 90 tablet 3     No current facility-administered medications for this visit  Allergies   Allergen Reactions    Mesalamine Hives and Rash     [facility] - [id number]:  Allergy is to Suppository      Immunizations:     Immunization History   Administered Date(s) Administered    COVID-19 MODERNA VACC 0 5 ML IM 05/18/2022    COVID-19 PFIZER VACCINE 0 3 ML IM 04/03/2021, 04/25/2021, 10/30/2021    DTaP 5 05/23/2008    Influenza Split High Dose Preservative Free IM 10/08/2016, 10/07/2017    Influenza, high dose seasonal 0 7 mL 10/13/2018, 10/12/2019, 09/29/2020, 10/02/2021    Pneumococcal Conjugate 13-Valent 09/16/2016    Pneumococcal Polysaccharide PPV23 08/20/2010, 09/19/2017    Td (adult), adsorbed 10/01/1998, 05/23/2008    Tdap 06/29/2018    Zoster 06/22/2012    Zoster Vaccine Recombinant 11/11/2018, 03/01/2019 Health Maintenance:         Topic Date Due    Breast Cancer Screening: Mammogram  10/15/2022    DXA SCAN  10/13/2025    Colorectal Cancer Screening  09/09/2026    Hepatitis C Screening  Completed         Topic Date Due    Influenza Vaccine (1) 09/01/2022      Medicare Screening Tests and Risk Assessments:     Paz Lopez is here for her Subsequent Wellness visit  Health Risk Assessment:   Patient rates overall health as very good  Patient feels that their physical health rating is same  Patient is very satisfied with their life  Eyesight was rated as same  Hearing was rated as same  Patient feels that their emotional and mental health rating is same  Patients states they are never, rarely angry  Patient states they are never, rarely unusually tired/fatigued  Pain experienced in the last 7 days has been none  Patient states that she has experienced no weight loss or gain in last 6 months  Depression Screening:   PHQ-2 Score: 0      Fall Risk Screening: In the past year, patient has experienced: no history of falling in past year      Urinary Incontinence Screening:   Patient has not leaked urine accidently in the last six months  Home Safety:  Patient does not have trouble with stairs inside or outside of their home  Patient has working smoke alarms and has working carbon monoxide detector  Home safety hazards include: none  Nutrition:   Current diet is Low Cholesterol and Limited junk food  Medications:   Patient is currently taking over-the-counter supplements  OTC medications include: Multivitamin, Calcium, 81 mg Aspirin  Patient is able to manage medications  Activities of Daily Living (ADLs)/Instrumental Activities of Daily Living (IADLs):   Walk and transfer into and out of bed and chair?: Yes  Dress and groom yourself?: Yes    Bathe or shower yourself?: Yes    Feed yourself?  Yes  Do your laundry/housekeeping?: Yes  Manage your money, pay your bills and track your expenses?: Yes  Make your own meals?: Yes    Do your own shopping?: Yes    Previous Hospitalizations:   Any hospitalizations or ED visits within the last 12 months?: No      Advance Care Planning:   Living will: Yes    Durable POA for healthcare: Yes    Advanced directive: Yes      Cognitive Screening:   Provider or family/friend/caregiver concerned regarding cognition?: No    PREVENTIVE SCREENINGS      Cardiovascular Screening:    General: Screening Not Indicated and History Lipid Disorder      Diabetes Screening:     General: Screening Current      Colorectal Cancer Screening:     General: Screening Current      Breast Cancer Screening:     General: Screening Current      Cervical Cancer Screening:    General: Screening Not Indicated      Osteoporosis Screening:    General: Screening Current      Abdominal Aortic Aneurysm (AAA) Screening:        General: Screening Not Indicated      Lung Cancer Screening:     General: Screening Not Indicated      Hepatitis C Screening:    General: Screening Current    Screening, Brief Intervention, and Referral to Treatment (SBIRT)    Screening  Typical number of drinks in a day: 0  Typical number of drinks in a week: 0  Interpretation: Low risk drinking behavior  AUDIT-C Screenin) How often did you have a drink containing alcohol in the past year? monthly or less  2) How many drinks did you have on a typical day when you were drinking in the past year?  1 to 2  3) How often did you have 6 or more drinks on one occasion in the past year? never    AUDIT-C Score: 1  Interpretation: Score 0-2 (female): Negative screen for alcohol misuse    Single Item Drug Screening:  How often have you used an illegal drug (including marijuana) or a prescription medication for non-medical reasons in the past year? never    Single Item Drug Screen Score: 0  Interpretation: Negative screen for possible drug use disorder    No exam data present     Physical Exam:     /88   Pulse 81   Resp 16   Ht 5' 6" (1 676 m)   Wt 51 9 kg (114 lb 6 4 oz)   SpO2 98%   BMI 18 46 kg/m²     Physical Exam  Constitutional:       General: She is not in acute distress  Appearance: She is well-developed and underweight  She is not ill-appearing, toxic-appearing or diaphoretic  HENT:      Head: Normocephalic and atraumatic  Right Ear: External ear normal  There is impacted cerumen  Left Ear: External ear normal  There is impacted cerumen  Ears:      Comments: Cerumen easily removed with currette  Eyes:      Conjunctiva/sclera: Conjunctivae normal    Cardiovascular:      Rate and Rhythm: Normal rate and regular rhythm  Heart sounds: Normal heart sounds  No murmur heard  Pulmonary:      Effort: Pulmonary effort is normal  No respiratory distress  Breath sounds: Normal breath sounds  No stridor  No wheezing or rales  Abdominal:      General: There is no distension  Palpations: Abdomen is soft  There is no mass  Tenderness: There is no abdominal tenderness  There is no guarding or rebound  Musculoskeletal:      Cervical back: Neck supple  Right lower leg: No edema  Left lower leg: No edema  Skin:     General: Skin is warm and dry  Neurological:      Mental Status: She is alert and oriented to person, place, and time  Psychiatric:         Mood and Affect: Mood normal          Behavior: Behavior normal          Thought Content:  Thought content normal          Judgment: Judgment normal           Jeremías Booth MD

## 2022-08-01 NOTE — PATIENT INSTRUCTIONS
Medicare Preventive Visit Patient Instructions  Thank you for completing your Welcome to Medicare Visit or Medicare Annual Wellness Visit today  Your next wellness visit will be due in one year (8/2/2023)  The screening/preventive services that you may require over the next 5-10 years are detailed below  Some tests may not apply to you based off risk factors and/or age  Screening tests ordered at today's visit but not completed yet may show as past due  Also, please note that scanned in results may not display below  Preventive Screenings:  Service Recommendations Previous Testing/Comments   Colorectal Cancer Screening  * Colonoscopy    * Fecal Occult Blood Test (FOBT)/Fecal Immunochemical Test (FIT)  * Fecal DNA/Cologuard Test  * Flexible Sigmoidoscopy Age: 54-65 years old   Colonoscopy: every 10 years (may be performed more frequently if at higher risk)  OR  FOBT/FIT: every 1 year  OR  Cologuard: every 3 years  OR  Sigmoidoscopy: every 5 years  Screening may be recommended earlier than age 48 if at higher risk for colorectal cancer  Also, an individualized decision between you and your healthcare provider will decide whether screening between the ages of 74-80 would be appropriate  Colonoscopy: 09/09/2021  FOBT/FIT: Not on file  Cologuard: Not on file  Sigmoidoscopy: Not on file          Breast Cancer Screening Age: 36 years old  Frequency: every 1-2 years  Not required if history of left and right mastectomy Mammogram: 10/15/2021        Cervical Cancer Screening Between the ages of 21-29, pap smear recommended once every 3 years  Between the ages of 33-67, can perform pap smear with HPV co-testing every 5 years     Recommendations may differ for women with a history of total hysterectomy, cervical cancer, or abnormal pap smears in past  Pap Smear: 12/15/2020        Hepatitis C Screening Once for adults born between 1945 and 1965  More frequently in patients at high risk for Hepatitis C Hep C Antibody: 06/01/2017        Diabetes Screening 1-2 times per year if you're at risk for diabetes or have pre-diabetes Fasting glucose: No results in last 5 years   A1C: No results in last 5 years        Cholesterol Screening Once every 5 years if you don't have a lipid disorder  May order more often based on risk factors  Lipid panel: 05/14/2022          Other Preventive Screenings Covered by Medicare:  1  Abdominal Aortic Aneurysm (AAA) Screening: covered once if your at risk  You're considered to be at risk if you have a family history of AAA  2  Lung Cancer Screening: covers low dose CT scan once per year if you meet all of the following conditions: (1) Age 50-69; (2) No signs or symptoms of lung cancer; (3) Current smoker or have quit smoking within the last 15 years; (4) You have a tobacco smoking history of at least 30 pack years (packs per day multiplied by number of years you smoked); (5) You get a written order from a healthcare provider  3  Glaucoma Screening: covered annually if you're considered high risk: (1) You have diabetes OR (2) Family history of glaucoma OR (3)  aged 48 and older OR (3)  American aged 72 and older  3  Osteoporosis Screening: covered every 2 years if you meet one of the following conditions: (1) You're estrogen deficient and at risk for osteoporosis based off medical history and other findings; (2) Have a vertebral abnormality; (3) On glucocorticoid therapy for more than 3 months; (4) Have primary hyperparathyroidism; (5) On osteoporosis medications and need to assess response to drug therapy  · Last bone density test (DXA Scan): 10/13/2020   5  HIV Screening: covered annually if you're between the age of 15-65  Also covered annually if you are younger than 13 and older than 72 with risk factors for HIV infection  For pregnant patients, it is covered up to 3 times per pregnancy      Immunizations:  Immunization Recommendations   Influenza Vaccine Annual influenza vaccination during flu season is recommended for all persons aged >= 6 months who do not have contraindications   Pneumococcal Vaccine (Prevnar and Pneumovax)  * Prevnar = PCV13  * Pneumovax = PPSV23   Adults 25-60 years old: 1-3 doses may be recommended based on certain risk factors  Adults 72 years old: Prevnar (PCV13) vaccine recommended followed by Pneumovax (PPSV23) vaccine  If already received PPSV23 since turning 65, then PCV13 recommended at least one year after PPSV23 dose  Hepatitis B Vaccine 3 dose series if at intermediate or high risk (ex: diabetes, end stage renal disease, liver disease)   Tetanus (Td) Vaccine - COST NOT COVERED BY MEDICARE PART B Following completion of primary series, a booster dose should be given every 10 years to maintain immunity against tetanus  Td may also be given as tetanus wound prophylaxis  Tdap Vaccine - COST NOT COVERED BY MEDICARE PART B Recommended at least once for all adults  For pregnant patients, recommended with each pregnancy  Shingles Vaccine (Shingrix) - COST NOT COVERED BY MEDICARE PART B  2 shot series recommended in those aged 48 and above     Health Maintenance Due:      Topic Date Due    Breast Cancer Screening: Mammogram  10/15/2022    DXA SCAN  10/13/2025    Colorectal Cancer Screening  09/09/2026    Hepatitis C Screening  Completed     Immunizations Due:      Topic Date Due    COVID-19 Vaccine (4 - Booster for Pfizer series) 02/28/2022    Influenza Vaccine (1) 09/01/2022     Advance Directives   What are advance directives? Advance directives are legal documents that state your wishes and plans for medical care  These plans are made ahead of time in case you lose your ability to make decisions for yourself  Advance directives can apply to any medical decision, such as the treatments you want, and if you want to donate organs  What are the types of advance directives?   There are many types of advance directives, and each state has rules about how to use them  You may choose a combination of any of the following:  · Living will: This is a written record of the treatment you want  You can also choose which treatments you do not want, which to limit, and which to stop at a certain time  This includes surgery, medicine, IV fluid, and tube feedings  · Durable power of  for healthcare [facility]): This is a written record that states who you want to make healthcare choices for you when you are unable to make them for yourself  This person, called a proxy, is usually a family member or a friend  You may choose more than 1 proxy  · Do not resuscitate (DNR) order:  A DNR order is used in case your heart stops beating or you stop breathing  It is a request not to have certain forms of treatment, such as CPR  A DNR order may be included in other types of advance directives  · Medical directive: This covers the care that you want if you are in a coma, near death, or unable to make decisions for yourself  You can list the treatments you want for each condition  Treatment may include pain medicine, surgery, blood transfusions, dialysis, IV or tube feedings, and a ventilator (breathing machine)  · Values history: This document has questions about your views, beliefs, and how you feel and think about life  This information can help others choose the care that you would choose  Why are advance directives important? An advance directive helps you control your care  Although spoken wishes may be used, it is better to have your wishes written down  Spoken wishes can be misunderstood, or not followed  Treatments may be given even if you do not want them  An advance directive may make it easier for your family to make difficult choices about your care  © Copyright [address] 2018 Information is for End User's use only and may not be sold, redistributed or otherwise used for commercial purposes   All illustrations and images included in [facility] are the copyrighted property of A D A M , [employer]  or [address] [place], [place] [address] [facility]

## 2022-08-01 NOTE — ASSESSMENT & PLAN NOTE
Blood work at the end of August/September to see how much lipids improve on atorvastatin   She is tolerating it well

## 2022-09-04 LAB
ALBUMIN SERPL-MCNC: 4.4 G/DL (ref 3.6–5.1)
ALBUMIN/GLOB SERPL: 1.7 (CALC) (ref 1–2.5)
ALP SERPL-CCNC: 57 U/L (ref 37–153)
ALT SERPL-CCNC: 21 U/L (ref 6–29)
AST SERPL-CCNC: 34 U/L (ref 10–35)
BILIRUB DIRECT SERPL-MCNC: 0.3 MG/DL
BILIRUB INDIRECT SERPL-MCNC: 1.4 MG/DL (CALC) (ref 0.2–1.2)
BILIRUB SERPL-MCNC: 1.7 MG/DL (ref 0.2–1.2)
CHOLEST SERPL-MCNC: 145 MG/DL
CHOLEST/HDLC SERPL: 2.1 (CALC)
GLOBULIN SER CALC-MCNC: 2.6 G/DL (CALC) (ref 1.9–3.7)
HDLC SERPL-MCNC: 68 MG/DL
LDLC SERPL CALC-MCNC: 61 MG/DL (CALC)
NONHDLC SERPL-MCNC: 77 MG/DL (CALC)
PROT SERPL-MCNC: 7 G/DL (ref 6.1–8.1)
TRIGL SERPL-MCNC: 84 MG/DL

## 2022-10-01 ENCOUNTER — IMMUNIZATIONS (OUTPATIENT)
Dept: INTERNAL MEDICINE CLINIC | Facility: CLINIC | Age: 72
End: 2022-10-01
Payer: COMMERCIAL

## 2022-10-01 DIAGNOSIS — Z23 ENCOUNTER FOR IMMUNIZATION: Primary | ICD-10-CM

## 2022-10-01 PROCEDURE — 90662 IIV NO PRSV INCREASED AG IM: CPT

## 2022-10-01 PROCEDURE — G0008 ADMIN INFLUENZA VIRUS VAC: HCPCS

## 2022-10-19 DIAGNOSIS — Z78.0 OSTEOPENIA AFTER MENOPAUSE: ICD-10-CM

## 2022-10-19 DIAGNOSIS — M85.80 OSTEOPENIA AFTER MENOPAUSE: ICD-10-CM

## 2022-10-19 DIAGNOSIS — Z12.31 ENCOUNTER FOR SCREENING MAMMOGRAM FOR MALIGNANT NEOPLASM OF BREAST: ICD-10-CM

## 2022-11-03 DIAGNOSIS — E78.2 MIXED HYPERLIPIDEMIA: ICD-10-CM

## 2022-11-04 RX ORDER — ATORVASTATIN CALCIUM 20 MG/1
20 TABLET, FILM COATED ORAL DAILY
Qty: 90 TABLET | Refills: 0 | Status: SHIPPED | OUTPATIENT
Start: 2022-11-04

## 2022-11-07 ENCOUNTER — TELEPHONE (OUTPATIENT)
Dept: OBGYN CLINIC | Facility: CLINIC | Age: 72
End: 2022-11-07

## 2022-11-07 NOTE — TELEPHONE ENCOUNTER
Pt's dexa scan results 10/19/2022 ([facility]) c/w osteopenia - pt takes Caltrate 600 mg with vit D - will increase to 1200 mg (usually has yogurt in diet), recom wt bearing exercise (walking 2-3x/wk

## 2022-11-07 NOTE — TELEPHONE ENCOUNTER
----- Message from Ramírez Fajardo MD sent at 11/6/2022  2:32 PM EST -----  Patient has mild osteopenia on DEXA scan     Please encourage exercise, good diet and calcium in diet including supplement     Thanks

## 2022-12-19 ENCOUNTER — ANNUAL EXAM (OUTPATIENT)
Dept: OBGYN CLINIC | Facility: CLINIC | Age: 72
End: 2022-12-19

## 2022-12-19 VITALS
HEIGHT: 66 IN | BODY MASS INDEX: 18.32 KG/M2 | SYSTOLIC BLOOD PRESSURE: 145 MMHG | WEIGHT: 114 LBS | DIASTOLIC BLOOD PRESSURE: 80 MMHG

## 2022-12-19 DIAGNOSIS — Z12.31 ENCOUNTER FOR SCREENING MAMMOGRAM FOR MALIGNANT NEOPLASM OF BREAST: Primary | ICD-10-CM

## 2022-12-19 DIAGNOSIS — N95.2 ATROPHIC VAGINITIS: ICD-10-CM

## 2022-12-19 DIAGNOSIS — Z01.419 ENCOUNTER FOR GYNECOLOGICAL EXAMINATION WITHOUT ABNORMAL FINDING: ICD-10-CM

## 2022-12-19 NOTE — PROGRESS NOTES
Assessment/Plan:    No problem-specific Assessment & Plan notes found for this encounter  Diagnoses and all orders for this visit:    Encounter for screening mammogram for malignant neoplasm of breast  -     Mammo screening bilateral w 3d & cad; Future    Encounter for gynecological examination without abnormal finding    Atrophic vaginitis          Normal gynecological physical examination  Self-breast examination stressed  Mammogram ordered  Discussed regular exercise, healthy diet, importance of vitamin D and calcium supplements  Discussed importance of sun block use during periods of prolonged sun exposure  Patient will be seen in 1 year for routine gynecologic and medical examination  Patient will call office for any problems, concerns, or issues which may arise during the interim  Subjective:          HPI    Patient ID: Sara Good is a 71 y o  female who presents today for her annual gynecologic and medical examination    Menstrual status: Patient is postmenopausal and denies any vaginal bleeding    Vasomotor symptoms: Patient denies any significant vasomotor symptoms    Patient reports normal appetite    Patient reports normal bowel and bladder habits    Patient denies any significant pelvic or abdominal pain    Patient denies any headaches, chest pain, shortness of breath fever shakes or chills    Patient denies any COVID 19 symptoms including cough or loss of taste or smell    COVID vaccine status: Patient up-to-date with COVID vaccination    Medical problems: Patient followed for thyroid and cholesterol    Colonoscopy status: Patient up-to-date with screening colonoscopy    Mammogram status: Patient does regular self breast examinations and is up-to-date with screening mammography as well  Appropriate arrangements for her annual screening mammogram were placed into the EMR system at today's visit      The following portions of the patient's history were reviewed and updated as appropriate: allergies, current medications, past family history, past medical history, past social history, past surgical history and problem list     Review of Systems   Constitutional: Negative  Negative for appetite change, diaphoresis, fatigue, fever and unexpected weight change  HENT: Negative  Eyes: Negative  Respiratory: Negative  Cardiovascular: Negative  Followed for cholesterol   Gastrointestinal: Negative  Negative for abdominal pain, blood in stool, constipation, diarrhea, nausea and vomiting  Endocrine: Negative  Negative for cold intolerance and heat intolerance  Followed for thyroid   Genitourinary: Negative  Negative for dysuria, frequency, hematuria, urgency, vaginal bleeding, vaginal discharge and vaginal pain  Musculoskeletal: Negative  Skin: Negative  Allergic/Immunologic: Negative  Neurological: Negative  Hematological: Negative  Negative for adenopathy  Psychiatric/Behavioral: Negative  Objective: There were no vitals taken for this visit  Physical Exam  Constitutional:       General: She is not in acute distress  Appearance: Normal appearance  She is well-developed  She is not diaphoretic  HENT:      Head: Normocephalic and atraumatic  Eyes:      Pupils: Pupils are equal, round, and reactive to light  Cardiovascular:      Rate and Rhythm: Normal rate and regular rhythm  Heart sounds: Normal heart sounds  No murmur heard  No friction rub  No gallop  Pulmonary:      Effort: Pulmonary effort is normal       Breath sounds: Normal breath sounds  Chest:   Breasts:     Breasts are symmetrical       Right: No inverted nipple, mass, nipple discharge, skin change or tenderness  Left: No inverted nipple, mass, nipple discharge, skin change or tenderness  Abdominal:      General: Bowel sounds are normal       Palpations: Abdomen is soft  Genitourinary:     General: Normal vulva        Exam position: Supine  Labia:         Right: No rash or lesion  Left: No rash or lesion  Urethra: No urethral swelling or urethral lesion  Vagina: Normal  No vaginal discharge, erythema, tenderness or bleeding  Cervix: No discharge or friability  Uterus: Not enlarged and not tender  Adnexa:         Right: No mass, tenderness or fullness  Left: No mass, tenderness or fullness  Rectum: Normal  Guaiac result negative  Comments: Pelvic exam revealed minimal atrophic vaginitis  Good pelvic support confirmed  Musculoskeletal:         General: Normal range of motion  Cervical back: Normal range of motion and neck supple  Lymphadenopathy:      Cervical: No cervical adenopathy  Upper Body:      Right upper body: No supraclavicular adenopathy  Left upper body: No supraclavicular adenopathy  Skin:     General: Skin is warm and dry  Findings: No rash  Neurological:      General: No focal deficit present  Mental Status: She is alert and oriented to person, place, and time  Psychiatric:         Mood and Affect: Mood normal          Speech: Speech normal          Behavior: Behavior normal          Thought Content:  Thought content normal          Judgment: Judgment normal

## 2023-04-19 ENCOUNTER — ESTABLISHED COMPREHENSIVE EXAM (OUTPATIENT)
Dept: URBAN - METROPOLITAN AREA CLINIC 6 | Facility: CLINIC | Age: 73
End: 2023-04-19

## 2023-04-19 DIAGNOSIS — H40.033: ICD-10-CM

## 2023-04-19 DIAGNOSIS — H43.813: ICD-10-CM

## 2023-04-19 DIAGNOSIS — H25.813: ICD-10-CM

## 2023-04-19 PROCEDURE — 92014 COMPRE OPH EXAM EST PT 1/>: CPT

## 2023-04-19 ASSESSMENT — VISUAL ACUITY
OD_CC: 20/20-2
OS_CC: 20/30
OU_CC: J1+

## 2023-04-19 ASSESSMENT — TONOMETRY
OD_IOP_MMHG: 16
OS_IOP_MMHG: 13

## 2023-07-21 ENCOUNTER — OFFICE VISIT (OUTPATIENT)
Dept: ENDOCRINOLOGY | Facility: CLINIC | Age: 73
End: 2023-07-21
Payer: COMMERCIAL

## 2023-07-21 VITALS
HEIGHT: 66 IN | SYSTOLIC BLOOD PRESSURE: 152 MMHG | WEIGHT: 119 LBS | BODY MASS INDEX: 19.13 KG/M2 | DIASTOLIC BLOOD PRESSURE: 84 MMHG | HEART RATE: 72 BPM

## 2023-07-21 DIAGNOSIS — M85.80 OSTEOPENIA, UNSPECIFIED LOCATION: ICD-10-CM

## 2023-07-21 DIAGNOSIS — E78.2 MIXED HYPERLIPIDEMIA: ICD-10-CM

## 2023-07-21 DIAGNOSIS — C73 PAPILLARY THYROID CARCINOMA (HCC): Primary | ICD-10-CM

## 2023-07-21 DIAGNOSIS — E89.0 POSTOPERATIVE HYPOTHYROIDISM: ICD-10-CM

## 2023-07-21 PROCEDURE — 99214 OFFICE O/P EST MOD 30 MIN: CPT | Performed by: INTERNAL MEDICINE

## 2023-07-21 NOTE — PROGRESS NOTES
Jenifer Viramontes 71 y.o. female MRN: [id number]    Encounter: [id number]      Assessment/Plan     Assessment: This is a 71y.o.-year-old female with thyroid cancer, postoperative hypothyroidism, osteopenia and hyperlipidemia. Plan:  1. Thyroid cancer-there is has been no evidence of recurrence. She is having laboratory testing done tomorrow which I have ordered. This includes thyroglobulin and thyroglobulin antibody. 2.  Postoperative hypothyroidism-check TSH and free T4. Continue current thyroid hormone supplementation. 3.  Osteopenia-she will be due for DEXA scan in 2024. This is ordered by Dr. Lenard Barros. 4.  Hyperlipidemia-continue statin. CC:   Thyroid cancer    History of Present Illness      HPI:  64-year-old woman with thyroid cancer for which she underwent a total thyroidectomy about 11 years ago. She feels well and has no complaints. She denies any difficulty swallowing or breathing. For postoperative hypothyroidism, she is on levothyroxine. She denies any symptoms of hypo or hyperthyroidism. Review of Systems   Constitutional: Negative for chills and fever. Respiratory: Negative for shortness of breath. Cardiovascular: Negative for chest pain. Gastrointestinal: Negative for constipation, diarrhea, nausea and vomiting. Endocrine: Negative for cold intolerance and heat intolerance. All other systems reviewed and are negative.       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 Social History     Substance and Sexual Activity   Drug Use No     Social History     Tobacco Use   Smoking Status Never   Smokeless Tobacco Never     Family History:   Family History   Problem Relation Age of Onset   • COPD Mother    • Seizures Mother    • Transient ischemic attack Mother         & cerebral infarction   • Hypertension Father    • Heart murmur Father    • Heart disease Father         pacemaker placement   • Breast cancer Sister 58        Stage 1 at diagnosis   • Diabetes Maternal Grandmother    • Diabetes unspecified Maternal Grandmother            • Cancer Family        Meds/Allergies   Current Outpatient Medications   Medication Sig Dispense Refill   • aspirin (ECOTRIN LOW STRENGTH) 81 mg EC tablet Take 1 tablet by mouth daily     • atorvastatin (LIPITOR) 20 mg tablet Take 1 tablet (20 mg total) by mouth daily at bedtime 90 tablet 2   • Calcium Carbonate-Vit D-Min (CALTRATE 600+D PLUS MINERALS) 600-800 MG-UNIT TABS      • Multiple Vitamins-Minerals (CENTRUM SILVER ULTRA WOMENS PO) Take 1 tablet by mouth daily     • Synthroid 75 MCG tablet Take 1 tablet Monday-Friday and 1/2 tablet on Saturday 90 tablet 3     No current facility-administered medications for this visit. Allergies   Allergen Reactions   • Mesalamine Hives and Rash     [facility] - [id number]: Allergy is to Suppository       Objective   Vitals: Blood pressure 152/84, pulse 72, height 5' 6" (1.676 m), weight 54 kg (119 lb). Physical Exam  Vitals reviewed. Constitutional:       General: She is not in acute distress. Appearance: She is well-developed. She is not diaphoretic. HENT:      Head: Normocephalic and atraumatic. Mouth/Throat:      Pharynx: No oropharyngeal exudate. Eyes:      General: Lids are normal. No scleral icterus. Right eye: No discharge.          Left eye: No discharge. Conjunctiva/sclera: Conjunctivae normal.   Neck:      Thyroid: No thyromegaly. Cardiovascular:      Rate and Rhythm: Normal rate and regular rhythm. Heart sounds: Normal heart sounds. No murmur heard. No friction rub. No gallop. Pulmonary:      Effort: Pulmonary effort is normal. No respiratory distress. Breath sounds: Normal breath sounds. No wheezing. Abdominal:      General: Bowel sounds are normal. There is no distension. Palpations: Abdomen is soft. Tenderness: There is no abdominal tenderness. Musculoskeletal:         General: No tenderness or deformity. Normal range of motion. Cervical back: Neck supple. Lymphadenopathy:      Head:      Right side of head: No occipital adenopathy. Left side of head: No occipital adenopathy. Upper Body:      Right upper body: No supraclavicular adenopathy. Left upper body: No supraclavicular adenopathy. Skin:     General: Skin is warm. Findings: No erythema or rash. Neurological:      Mental Status: She is alert and oriented to person, place, and time. Cranial Nerves: No cranial nerve deficit. Psychiatric:         Mood and Affect: Mood normal.         Behavior: Behavior normal.         The history was obtained from the review of the chart, patient. Lab Results:      Imaging Studies:  Results for orders placed during the hospital encounter of 11/13/17    US head neck lymph node mapping    Impression  No evidence of recurrent or metastatic disease. Workstation performed: [id number]         I have personally reviewed pertinent reports. Portions of the record may have been created with voice recognition software. Occasional wrong word or "sound a like" substitutions may have occurred due to the inherent limitations of voice recognition software. Read the chart carefully and recognize, using context, where substitutions have occurred.

## 2023-07-23 LAB
ALBUMIN SERPL-MCNC: 4.4 G/DL (ref 3.6–5.1)
ALBUMIN/GLOB SERPL: 1.8 (CALC) (ref 1–2.5)
ALP SERPL-CCNC: 63 U/L (ref 37–153)
ALT SERPL-CCNC: 23 U/L (ref 6–29)
AST SERPL-CCNC: 36 U/L (ref 10–35)
BASOPHILS # BLD AUTO: 29 CELLS/UL (ref 0–200)
BASOPHILS NFR BLD AUTO: 0.5 %
BILIRUB SERPL-MCNC: 1.8 MG/DL (ref 0.2–1.2)
BUN SERPL-MCNC: 12 MG/DL (ref 7–25)
BUN/CREAT SERPL: ABNORMAL (CALC) (ref 6–22)
CALCIUM SERPL-MCNC: 9.5 MG/DL (ref 8.6–10.4)
CHLORIDE SERPL-SCNC: 104 MMOL/L (ref 98–110)
CHOLEST SERPL-MCNC: 136 MG/DL
CHOLEST/HDLC SERPL: 1.9 (CALC)
CO2 SERPL-SCNC: 29 MMOL/L (ref 20–32)
CREAT SERPL-MCNC: 0.74 MG/DL (ref 0.6–1)
EOSINOPHIL # BLD AUTO: 80 CELLS/UL (ref 15–500)
EOSINOPHIL NFR BLD AUTO: 1.4 %
ERYTHROCYTE [DISTWIDTH] IN BLOOD BY AUTOMATED COUNT: 12.2 % (ref 11–15)
GFR/BSA.PRED SERPLBLD CYS-BASED-ARV: 86 ML/MIN/1.73M2
GLOBULIN SER CALC-MCNC: 2.5 G/DL (CALC) (ref 1.9–3.7)
GLUCOSE SERPL-MCNC: 106 MG/DL (ref 65–99)
HCT VFR BLD AUTO: 38.2 % (ref 35–45)
HDLC SERPL-MCNC: 70 MG/DL
HGB BLD-MCNC: 12.6 G/DL (ref 11.7–15.5)
LDLC SERPL CALC-MCNC: 51 MG/DL (CALC)
LYMPHOCYTES # BLD AUTO: 1037 CELLS/UL (ref 850–3900)
LYMPHOCYTES NFR BLD AUTO: 18.2 %
MCH RBC QN AUTO: 30.7 PG (ref 27–33)
MCHC RBC AUTO-ENTMCNC: 33 G/DL (ref 32–36)
MCV RBC AUTO: 93.2 FL (ref 80–100)
MONOCYTES # BLD AUTO: 433 CELLS/UL (ref 200–950)
MONOCYTES NFR BLD AUTO: 7.6 %
NEUTROPHILS # BLD AUTO: 4121 CELLS/UL (ref 1500–7800)
NEUTROPHILS NFR BLD AUTO: 72.3 %
NONHDLC SERPL-MCNC: 66 MG/DL (CALC)
PLATELET # BLD AUTO: 189 THOUSAND/UL (ref 140–400)
PMV BLD REES-ECKER: 10.2 FL (ref 7.5–12.5)
POTASSIUM SERPL-SCNC: 3.7 MMOL/L (ref 3.5–5.3)
PROT SERPL-MCNC: 6.9 G/DL (ref 6.1–8.1)
RBC # BLD AUTO: 4.1 MILLION/UL (ref 3.8–5.1)
SODIUM SERPL-SCNC: 142 MMOL/L (ref 135–146)
TRIGL SERPL-MCNC: 66 MG/DL
WBC # BLD AUTO: 5.7 THOUSAND/UL (ref 3.8–10.8)

## 2023-07-24 DIAGNOSIS — R74.01 ELEVATED AST (SGOT): ICD-10-CM

## 2023-07-24 DIAGNOSIS — R73.01 IMPAIRED FASTING BLOOD SUGAR: Primary | ICD-10-CM

## 2023-07-24 LAB
T4 FREE SERPL-MCNC: 1.3 NG/DL (ref 0.8–1.8)
THYROGLOB AB SERPL-ACNC: <0.1 NG/ML
THYROGLOB AB SERPL-ACNC: <1 IU/ML
TSH SERPL-ACNC: 0.69 MIU/L (ref 0.4–4.5)

## 2023-07-27 DIAGNOSIS — C73 PAPILLARY THYROID CARCINOMA (HCC): Primary | ICD-10-CM

## 2023-07-27 NOTE — PROGRESS NOTES
Please send a lab slip for TSH, free T4, thyroglobulin and thyroglobulin antibody to be done to the next visit.

## 2023-08-21 LAB
ALBUMIN SERPL-MCNC: 4.6 G/DL (ref 3.6–5.1)
ALBUMIN/GLOB SERPL: 1.8 (CALC) (ref 1–2.5)
ALP SERPL-CCNC: 61 U/L (ref 37–153)
ALT SERPL-CCNC: 27 U/L (ref 6–29)
AST SERPL-CCNC: 34 U/L (ref 10–35)
BILIRUB DIRECT SERPL-MCNC: 0.3 MG/DL
BILIRUB INDIRECT SERPL-MCNC: 1.4 MG/DL (CALC) (ref 0.2–1.2)
BILIRUB SERPL-MCNC: 1.7 MG/DL (ref 0.2–1.2)
EST. AVERAGE GLUCOSE BLD GHB EST-MCNC: 120 MG/DL
EST. AVERAGE GLUCOSE BLD GHB EST-SCNC: 6.6 MMOL/L
GLOBULIN SER CALC-MCNC: 2.6 G/DL (CALC) (ref 1.9–3.7)
HBA1C MFR BLD: 5.8 % OF TOTAL HGB
PROT SERPL-MCNC: 7.2 G/DL (ref 6.1–8.1)

## 2023-08-25 ENCOUNTER — OFFICE VISIT (OUTPATIENT)
Dept: INTERNAL MEDICINE CLINIC | Facility: CLINIC | Age: 73
End: 2023-08-25
Payer: COMMERCIAL

## 2023-08-25 VITALS
DIASTOLIC BLOOD PRESSURE: 82 MMHG | OXYGEN SATURATION: 98 % | SYSTOLIC BLOOD PRESSURE: 144 MMHG | HEART RATE: 86 BPM | HEIGHT: 66 IN | WEIGHT: 113 LBS | RESPIRATION RATE: 16 BRPM | BODY MASS INDEX: 18.16 KG/M2

## 2023-08-25 DIAGNOSIS — R17 ELEVATED BILIRUBIN: ICD-10-CM

## 2023-08-25 DIAGNOSIS — E78.2 MIXED HYPERLIPIDEMIA: Primary | ICD-10-CM

## 2023-08-25 DIAGNOSIS — R73.01 IMPAIRED FASTING GLUCOSE: ICD-10-CM

## 2023-08-25 DIAGNOSIS — R74.01 ELEVATED AST (SGOT): ICD-10-CM

## 2023-08-25 DIAGNOSIS — K51.90 ULCERATIVE COLITIS WITHOUT COMPLICATIONS, UNSPECIFIED LOCATION (HCC): ICD-10-CM

## 2023-08-25 DIAGNOSIS — Z00.00 MEDICARE ANNUAL WELLNESS VISIT, SUBSEQUENT: ICD-10-CM

## 2023-08-25 DIAGNOSIS — R03.0 ELEVATED BLOOD PRESSURE READING IN OFFICE WITH WHITE COAT SYNDROME, WITHOUT DIAGNOSIS OF HYPERTENSION: ICD-10-CM

## 2023-08-25 PROCEDURE — G0439 PPPS, SUBSEQ VISIT: HCPCS | Performed by: INTERNAL MEDICINE

## 2023-08-25 PROCEDURE — 99214 OFFICE O/P EST MOD 30 MIN: CPT | Performed by: INTERNAL MEDICINE

## 2023-08-25 RX ORDER — ATORVASTATIN CALCIUM 20 MG/1
10 TABLET, FILM COATED ORAL
Qty: 90 TABLET | Refills: 2
Start: 2023-08-25

## 2023-08-25 NOTE — ASSESSMENT & PLAN NOTE
She has felt a little tired on the statin  OK to lower to 10mg   Her bilirubin has been elevated since starting this  Obtain RUQ but expect to be normal

## 2023-08-25 NOTE — ASSESSMENT & PLAN NOTE
She ahs been restricting ehr diet since seeing the elevated A1C  I advised her against making too many restrictions  She also realized she is not drinking enough water so encouraged to stay well hydrated

## 2023-08-25 NOTE — PATIENT INSTRUCTIONS
Medicare Preventive Visit Patient Instructions  Thank you for completing your Welcome to Medicare Visit or Medicare Annual Wellness Visit today. Your next wellness visit will be due in one year (8/25/2024). The screening/preventive services that you may require over the next 5-10 years are detailed below. Some tests may not apply to you based off risk factors and/or age. Screening tests ordered at today's visit but not completed yet may show as past due. Also, please note that scanned in results may not display below. Preventive Screenings:  Service Recommendations Previous Testing/Comments   Colorectal Cancer Screening  * Colonoscopy    * Fecal Occult Blood Test (FOBT)/Fecal Immunochemical Test (FIT)  * Fecal DNA/Cologuard Test  * Flexible Sigmoidoscopy Age: 43-73 years old   Colonoscopy: every 10 years (may be performed more frequently if at higher risk)  OR  FOBT/FIT: every 1 year  OR  Cologuard: every 3 years  OR  Sigmoidoscopy: every 5 years  Screening may be recommended earlier than age 39 if at higher risk for colorectal cancer. Also, an individualized decision between you and your healthcare provider will decide whether screening between the ages of 77-80 would be appropriate. Colonoscopy: 09/09/2021  FOBT/FIT: Not on file  Cologuard: Not on file  Sigmoidoscopy: Not on file          Breast Cancer Screening Age: 36 years old  Frequency: every 1-2 years  Not required if history of left and right mastectomy Mammogram: 10/17/2022        Cervical Cancer Screening Between the ages of 21-29, pap smear recommended once every 3 years. Between the ages of 32-69, can perform pap smear with HPV co-testing every 5 years.    Recommendations may differ for women with a history of total hysterectomy, cervical cancer, or abnormal pap smears in past. Pap Smear: 12/19/2022        Hepatitis C Screening Once for adults born between 1945 and 1965  More frequently in patients at high risk for Hepatitis C Hep C Antibody: 06/01/2017        Diabetes Screening 1-2 times per year if you're at risk for diabetes or have pre-diabetes Fasting glucose: No results in last 5 years (No results in last 5 years)  A1C: 5.8 % of total Hgb (8/21/2023)      Cholesterol Screening Once every 5 years if you don't have a lipid disorder. May order more often based on risk factors. Lipid panel: 07/22/2023          Other Preventive Screenings Covered by Medicare:  1. Abdominal Aortic Aneurysm (AAA) Screening: covered once if your at risk. You're considered to be at risk if you have a family history of AAA. 2. Lung Cancer Screening: covers low dose CT scan once per year if you meet all of the following conditions: (1) Age 48-67; (2) No signs or symptoms of lung cancer; (3) Current smoker or have quit smoking within the last 15 years; (4) You have a tobacco smoking history of at least 20 pack years (packs per day multiplied by number of years you smoked); (5) You get a written order from a healthcare provider. 3. Glaucoma Screening: covered annually if you're considered high risk: (1) You have diabetes OR (2) Family history of glaucoma OR (3)  aged 48 and older OR (3)  American aged 72 and older  3. Osteoporosis Screening: covered every 2 years if you meet one of the following conditions: (1) You're estrogen deficient and at risk for osteoporosis based off medical history and other findings; (2) Have a vertebral abnormality; (3) On glucocorticoid therapy for more than 3 months; (4) Have primary hyperparathyroidism; (5) On osteoporosis medications and need to assess response to drug therapy. · Last bone density test (DXA Scan): 10/17/2022.  5. HIV Screening: covered annually if you're between the age of 15-65. Also covered annually if you are younger than 13 and older than 72 with risk factors for HIV infection. For pregnant patients, it is covered up to 3 times per pregnancy.     Immunizations:  Immunization Recommendations Influenza Vaccine Annual influenza vaccination during flu season is recommended for all persons aged >= 6 months who do not have contraindications   Pneumococcal Vaccine   * Pneumococcal conjugate vaccine = PCV13 (Prevnar 13), PCV15 (Vaxneuvance), PCV20 (Prevnar 20)  * Pneumococcal polysaccharide vaccine = PPSV23 (Pneumovax) Adults 20-63 years old: 1-3 doses may be recommended based on certain risk factors  Adults 72 years old: 1-2 doses may be recommended based off what pneumonia vaccine you previously received   Hepatitis B Vaccine 3 dose series if at intermediate or high risk (ex: diabetes, end stage renal disease, liver disease)   Tetanus (Td) Vaccine - COST NOT COVERED BY MEDICARE PART B Following completion of primary series, a booster dose should be given every 10 years to maintain immunity against tetanus. Td may also be given as tetanus wound prophylaxis. Tdap Vaccine - COST NOT COVERED BY MEDICARE PART B Recommended at least once for all adults. For pregnant patients, recommended with each pregnancy. Shingles Vaccine (Shingrix) - COST NOT COVERED BY MEDICARE PART B  2 shot series recommended in those aged 48 and above     Health Maintenance Due:      Topic Date Due   • Breast Cancer Screening: Mammogram  10/17/2023   • Colorectal Cancer Screening  09/09/2026   • DXA SCAN  10/17/2027   • Hepatitis C Screening  Completed     Immunizations Due:      Topic Date Due   • COVID-19 Vaccine (6 - Pfizer series) 02/09/2023   • Influenza Vaccine (1) 09/01/2023     Advance Directives   What are advance directives? Advance directives are legal documents that state your wishes and plans for medical care. These plans are made ahead of time in case you lose your ability to make decisions for yourself. Advance directives can apply to any medical decision, such as the treatments you want, and if you want to donate organs. What are the types of advance directives?   There are many types of advance directives, and each state has rules about how to use them. You may choose a combination of any of the following:  · Living will: This is a written record of the treatment you want. You can also choose which treatments you do not want, which to limit, and which to stop at a certain time. This includes surgery, medicine, IV fluid, and tube feedings. · Durable power of  for healthcare [facility]): This is a written record that states who you want to make healthcare choices for you when you are unable to make them for yourself. This person, called a proxy, is usually a family member or a friend. You may choose more than 1 proxy. · Do not resuscitate (DNR) order:  A DNR order is used in case your heart stops beating or you stop breathing. It is a request not to have certain forms of treatment, such as CPR. A DNR order may be included in other types of advance directives. · Medical directive: This covers the care that you want if you are in a coma, near death, or unable to make decisions for yourself. You can list the treatments you want for each condition. Treatment may include pain medicine, surgery, blood transfusions, dialysis, IV or tube feedings, and a ventilator (breathing machine). · Values history: This document has questions about your views, beliefs, and how you feel and think about life. This information can help others choose the care that you would choose. Why are advance directives important? An advance directive helps you control your care. Although spoken wishes may be used, it is better to have your wishes written down. Spoken wishes can be misunderstood, or not followed. Treatments may be given even if you do not want them. An advance directive may make it easier for your family to make difficult choices about your care. © Copyright [employer] 2018 Information is for End User's use only and may not be sold, redistributed or otherwise used for commercial purposes.  All illustrations and images included in [facility] are the copyrighted property of [employer] or [address] [place] [address]

## 2023-08-25 NOTE — PROGRESS NOTES
Assessment and Plan:     Problem List Items Addressed This Visit        Digestive    Ulcerative colitis without complications ([address])     IN remission            Endocrine    Impaired fasting glucose     She ahs been restricting ehr diet since seeing the elevated A1C  I advised her against making too many restrictions  She also realized she is not drinking enough water so encouraged to stay well hydrated         Relevant Orders    HEMOGLOBIN A1C W/ EAG ESTIMATION    CBC and differential    HEMOGLOBIN A1C W/ EAG ESTIMATION       Other    Medicare annual wellness visit, subsequent    Hyperlipidemia - Primary     She has felt a little tired on the statin  OK to lower to 10mg   Her bilirubin has been elevated since starting this  Obtain RUQ but expect to be normal         Relevant Medications    atorvastatin (LIPITOR) 20 mg tablet    Other Relevant Orders    CBC and differential    Comprehensive metabolic panel    Lipid panel    Elevated blood pressure reading in office with white coat syndrome, without diagnosis of hypertension     Home -120/50-70 and she checks BID        Other Visit Diagnoses     Elevated AST (SGOT)        Relevant Orders    US right upper quadrant    CBC and differential    Comprehensive metabolic panel    Elevated bilirubin        Relevant Orders    US right upper quadrant    CBC and differential    Comprehensive metabolic panel        BMI Counseling: Body mass index is 18.24 kg/m². The BMI is below normal. Patient advised to gain weight. Rationale for BMI follow-up plan is due to patient being underweight. Depression Screening and Follow-up Plan: Patient was screened for depression during today's encounter. They screened negative with a PHQ-2 score of 0. Preventive health issues were discussed with patient, and age appropriate screening tests were ordered as noted in patient's After Visit Summary.   Personalized health advice and appropriate referrals for health education or preventive services given if needed, as noted in patient's After Visit Summary. History of Present Illness:     Patient presents for a Medicare Wellness Visit    HPI   Patient Care Team:  Diego Travis MD as PCP - Tiffanie Cummins MD as PCP - Endocrinology (Endocrinology)  MD Syed Keating MD Bradly Height, MD Exie Marchi, MD Patt Braver, MD     Review of Systems:     Review of Systems   Constitutional: Negative for unexpected weight change. Respiratory: Negative for shortness of breath. Cardiovascular: Negative for chest pain, palpitations and leg swelling. Gastrointestinal: Negative for abdominal pain, constipation and diarrhea. Neurological: Negative for dizziness and headaches.         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[address])     last assessed 11/17/17   • Seborrheic keratosis     last assessed 5/1/15, resolved 11/2/15   • Stool guaiac positive     last assessed 11/19/14, resolved 11/2/15   • Ulcerative colitis without complications ([address])    • Vaginal atrophy     last assessed 11/19/14, resolved 11/2/15     Past Surgical History:   Procedure Laterality Date   • THYROIDECTOMY      last assessed 6/13/16   • TOOTH EXTRACTION      last assessed 6/13/16      Family History:     Family History   Problem Relation Age of Onset   • COPD Mother    • Seizures Mother    • Transient ischemic attack Mother         & cerebral infarction   • Hypertension Father    • Heart murmur Father    • Heart disease Father pacemaker placement   • Breast cancer Sister 58        Stage 1 at diagnosis   • Diabetes Maternal Grandmother    • Diabetes unspecified Maternal Grandmother            • Cancer Family       Social History:     Social History     Socioeconomic History   • Marital status:      Spouse name: None   • Number of children: None   • Years of education: None   • Highest education level: None   Occupational History   • None   Tobacco Use   • Smoking status: Never   • Smokeless tobacco: Never   Vaping Use   • Vaping Use: Never used   Substance and Sexual Activity   • Alcohol use: Yes     Comment: One glass of wine occasionally. • Drug use: No   • Sexual activity: Not Currently   Other Topics Concern   • None   Social History Narrative    Feels safe at home     Home environment domestic violence: denied      Social Determinants of Health     Financial Resource Strain: Low Risk  (2023)    Overall Financial Resource Strain (CARDIA)    • Difficulty of Paying Living Expenses: Not hard at all   Food Insecurity: Not on file   Transportation Needs: No Transportation Needs (2023)    PRAPARE - Transportation    • Lack of Transportation (Medical): No    • Lack of Transportation (Non-Medical):  No   Physical Activity: Not on file   Stress: Not on file   Social Connections: Not on file   Intimate Partner Violence: Not on file   Housing Stability: Not on file      Medications and Allergies:     Current Outpatient Medications   Medication Sig Dispense Refill   • aspirin (ECOTRIN LOW STRENGTH) 81 mg EC tablet Take 1 tablet by mouth daily     • atorvastatin (LIPITOR) 20 mg tablet Take 0.5 tablets (10 mg total) by mouth daily at bedtime 90 tablet 2   • Calcium Carbonate-Vit D-Min (CALTRATE 600+D PLUS MINERALS) 600-800 MG-UNIT TABS      • Multiple Vitamins-Minerals (CENTRUM SILVER ULTRA WOMENS PO) Take 1 tablet by mouth daily     • Synthroid 75 MCG tablet Take 1 tablet Monday-Friday and 1/2 tablet on Saturday 90 tablet 3     No current facility-administered medications for this visit. Allergies   Allergen Reactions   • Mesalamine Hives and Rash     Annotation - [id number]: Allergy is to Suppository      Immunizations:     Immunization History   Administered Date(s) Administered   • COVID-19 MODERNA VACC 0.25 ML IM BOOSTER 05/18/2022   • COVID-19 Moderna Vac BIVALENT 12 Yr+ IM (BOOSTER ONLY) 0.5 ML 10/09/2022   • COVID-19 PFIZER VACCINE 0.3 ML IM 04/03/2021, 04/25/2021, 10/30/2021   • DTaP 5 05/23/2008   • Influenza Split High Dose Preservative Free IM 10/08/2016, 10/07/2017   • Influenza, high dose seasonal 0.7 mL 10/13/2018, 10/12/2019, 09/29/2020, 10/02/2021, 10/01/2022   • Pneumococcal Conjugate 13-Valent 09/16/2016   • Pneumococcal Polysaccharide PPV23 08/20/2010, 09/19/2017   • Td (adult), adsorbed 10/01/1998, 05/23/2008   • Tdap 06/29/2018   • Zoster 06/22/2012   • Zoster Vaccine Recombinant 11/11/2018, 03/01/2019      Health Maintenance:         Topic Date Due   • Breast Cancer Screening: Mammogram  10/17/2023   • Colorectal Cancer Screening  09/09/2026   • DXA SCAN  10/17/2027   • Hepatitis C Screening  Completed         Topic Date Due   • COVID-19 Vaccine (6 - Pfizer series) 02/09/2023   • Influenza Vaccine (1) 09/01/2023      Medicare Screening Tests and Risk Assessments:     José Manuel Rodriguez is here for her Subsequent Wellness visit. Health Risk Assessment:   Patient rates overall health as very good. Patient feels that their physical health rating is same. Patient is satisfied with their life. Eyesight was rated as same. Hearing was rated as same. Patient feels that their emotional and mental health rating is same. Patients states they are never, rarely angry. Patient states they are sometimes unusually tired/fatigued. Pain experienced in the last 7 days has been some. Patient's pain rating has been 1/10. Patient states that she has experienced no weight loss or gain in last 6 months.      Depression Screening:   PHQ-2 Score: 0      Fall Risk Screening: In the past year, patient has experienced: no history of falling in past year      Urinary Incontinence Screening:   Patient has not leaked urine accidently in the last six months. Home Safety:  Patient does not have trouble with stairs inside or outside of their home. Patient has working smoke alarms and has working carbon monoxide detector. Home safety hazards include: none. Nutrition:   Current diet is Regular. Medications:   Patient is currently taking over-the-counter supplements. OTC medications include: Multivitamin and calcium. Patient is able to manage medications. Activities of Daily Living (ADLs)/Instrumental Activities of Daily Living (IADLs):   Walk and transfer into and out of bed and chair?: Yes  Dress and groom yourself?: Yes    Bathe or shower yourself?: Yes    Feed yourself? Yes  Do your laundry/housekeeping?: Yes  Manage your money, pay your bills and track your expenses?: Yes  Make your own meals?: Yes    Do your own shopping?: Yes    Previous Hospitalizations:   Any hospitalizations or ED visits within the last 12 months?: No      Advance Care Planning:   Living will: Yes    Durable POA for healthcare:  Yes    Advanced directive: No      Cognitive Screening:   Provider or family/friend/caregiver concerned regarding cognition?: No    PREVENTIVE SCREENINGS      Cardiovascular Screening:    General: Screening Not Indicated and History Lipid Disorder      Diabetes Screening:     General: Screening Current      Colorectal Cancer Screening:     General: Screening Current      Breast Cancer Screening:     General: Screening Current      Cervical Cancer Screening:    General: Screening Not Indicated      Osteoporosis Screening:    General: Screening Current      Abdominal Aortic Aneurysm (AAA) Screening:        General: Screening Not Indicated      Lung Cancer Screening:     General: Screening Not Indicated      Hepatitis C Screening:    General: Screening Current    Screening, Brief Intervention, and Referral to Treatment (SBIRT)    Screening  Typical number of drinks in a day: 0  Typical number of drinks in a week: 0  Interpretation: Low risk drinking behavior. AUDIT-C Screenin) How often did you have a drink containing alcohol in the past year? monthly or less  2) How many drinks did you have on a typical day when you were drinking in the past year? 1 to 2  3) How often did you have 6 or more drinks on one occasion in the past year? never    AUDIT-C Score: 1  Interpretation: Score 0-2 (female): Negative screen for alcohol misuse    Single Item Drug Screening:  How often have you used an illegal drug (including marijuana) or a prescription medication for non-medical reasons in the past year? never    Single Item Drug Screen Score: 0  Interpretation: Negative screen for possible drug use disorder    No results found. Physical Exam:     /82   Pulse 86   Resp 16   Ht 5' 6" (1.676 m)   Wt 51.3 kg (113 lb)   SpO2 98%   BMI 18.24 kg/m²     Physical Exam  Constitutional:       General: She is not in acute distress. Appearance: She is well-developed. She is not ill-appearing, toxic-appearing or diaphoretic. HENT:      Head: Normocephalic and atraumatic. Right Ear: External ear normal. There is impacted cerumen (moderate cerumen easily removed with currette). Left Ear: External ear normal. There is impacted cerumen (little cerumen easily removed with currette). Eyes:      Conjunctiva/sclera: Conjunctivae normal.   Cardiovascular:      Rate and Rhythm: Normal rate and regular rhythm. Heart sounds: Normal heart sounds. No murmur heard. Pulmonary:      Effort: Pulmonary effort is normal. No respiratory distress. Breath sounds: Normal breath sounds. No stridor. No wheezing or rales. Abdominal:      General: There is no distension. Palpations: Abdomen is soft. There is no mass. Tenderness: There is no abdominal tenderness. There is no guarding or rebound. Musculoskeletal:      Right lower leg: No edema. Left lower leg: No edema. Neurological:      Mental Status: She is alert and oriented to person, place, and time. Psychiatric:         Mood and Affect: Mood normal.         Behavior: Behavior normal.         Thought Content:  Thought content normal.         Judgment: Judgment normal.          Khadra Cook MD

## 2023-08-30 ENCOUNTER — HOSPITAL ENCOUNTER (OUTPATIENT)
Dept: RADIOLOGY | Age: 73
Discharge: HOME | End: 2023-08-30
Payer: COMMERCIAL

## 2023-08-30 DIAGNOSIS — R74.01 ELEVATED AST (SGOT): ICD-10-CM

## 2023-08-30 DIAGNOSIS — R17 ELEVATED BILIRUBIN: ICD-10-CM

## 2023-08-30 PROCEDURE — 76705 ECHO EXAM OF ABDOMEN: CPT

## 2023-09-08 ENCOUNTER — TELEPHONE (OUTPATIENT)
Dept: INTERNAL MEDICINE CLINIC | Facility: CLINIC | Age: 73
End: 2023-09-08

## 2023-09-08 DIAGNOSIS — C73 PAPILLARY THYROID CARCINOMA (HCC): ICD-10-CM

## 2023-09-08 RX ORDER — LEVOTHYROXINE SODIUM 75 MCG
TABLET ORAL
Qty: 90 TABLET | Refills: 0 | Status: SHIPPED | OUTPATIENT
Start: 2023-09-08

## 2023-09-12 DIAGNOSIS — K76.89 LIVER CYST: Primary | ICD-10-CM

## 2023-09-12 DIAGNOSIS — K86.2 PANCREATIC CYST: ICD-10-CM

## 2023-09-12 DIAGNOSIS — K76.9 LIVER LESION: ICD-10-CM

## 2023-09-13 DIAGNOSIS — K76.9 LIVER LESION: ICD-10-CM

## 2023-09-13 DIAGNOSIS — K86.2 PANCREATIC CYST: ICD-10-CM

## 2023-09-13 DIAGNOSIS — K76.89 LIVER CYST: Primary | ICD-10-CM

## 2023-10-01 ENCOUNTER — HOSPITAL ENCOUNTER (OUTPATIENT)
Dept: RADIOLOGY | Facility: HOSPITAL | Age: 73
Discharge: HOME | End: 2023-10-01
Payer: COMMERCIAL

## 2023-10-01 DIAGNOSIS — K76.9 LIVER LESION: ICD-10-CM

## 2023-10-01 DIAGNOSIS — K86.2 PANCREATIC CYST: ICD-10-CM

## 2023-10-01 PROCEDURE — A9585 GADOBUTROL INJECTION: HCPCS | Performed by: INTERNAL MEDICINE

## 2023-10-01 PROCEDURE — G1004 CDSM NDSC: HCPCS

## 2023-10-01 PROCEDURE — 74183 MRI ABD W/O CNTR FLWD CNTR: CPT

## 2023-10-01 RX ORDER — GADOBUTROL 604.72 MG/ML
5 INJECTION INTRAVENOUS
Status: COMPLETED | OUTPATIENT
Start: 2023-10-01 | End: 2023-10-01

## 2023-10-01 RX ADMIN — GADOBUTROL 5 ML: 604.72 INJECTION INTRAVENOUS at 08:32

## 2023-10-07 ENCOUNTER — IMMUNIZATIONS (OUTPATIENT)
Dept: INTERNAL MEDICINE CLINIC | Facility: CLINIC | Age: 73
End: 2023-10-07
Payer: COMMERCIAL

## 2023-10-07 DIAGNOSIS — Z23 ENCOUNTER FOR IMMUNIZATION: Primary | ICD-10-CM

## 2023-10-07 PROCEDURE — 90662 IIV NO PRSV INCREASED AG IM: CPT

## 2023-10-07 PROCEDURE — G0008 ADMIN INFLUENZA VIRUS VAC: HCPCS

## 2023-10-17 DIAGNOSIS — K86.2 PANCREATIC CYST: Primary | ICD-10-CM

## 2023-11-26 LAB
EST. AVERAGE GLUCOSE BLD GHB EST-MCNC: 120 MG/DL
EST. AVERAGE GLUCOSE BLD GHB EST-SCNC: 6.6 MMOL/L
HBA1C MFR BLD: 5.8 % OF TOTAL HGB

## 2023-12-13 DIAGNOSIS — Z12.31 ENCOUNTER FOR SCREENING MAMMOGRAM FOR MALIGNANT NEOPLASM OF BREAST: ICD-10-CM

## 2023-12-20 ENCOUNTER — ANNUAL EXAM (OUTPATIENT)
Dept: OBGYN CLINIC | Facility: CLINIC | Age: 73
End: 2023-12-20
Payer: COMMERCIAL

## 2023-12-20 VITALS
BODY MASS INDEX: 18.64 KG/M2 | WEIGHT: 116 LBS | HEIGHT: 66 IN | DIASTOLIC BLOOD PRESSURE: 82 MMHG | SYSTOLIC BLOOD PRESSURE: 142 MMHG

## 2023-12-20 DIAGNOSIS — Z12.31 ENCOUNTER FOR SCREENING MAMMOGRAM FOR MALIGNANT NEOPLASM OF BREAST: Primary | ICD-10-CM

## 2023-12-20 DIAGNOSIS — Z01.419 ENCOUNTER FOR GYNECOLOGICAL EXAMINATION WITHOUT ABNORMAL FINDING: ICD-10-CM

## 2023-12-20 DIAGNOSIS — N95.2 ATROPHIC VAGINITIS: ICD-10-CM

## 2023-12-20 DIAGNOSIS — Z78.0 OSTEOPENIA AFTER MENOPAUSE: ICD-10-CM

## 2023-12-20 DIAGNOSIS — M85.80 OSTEOPENIA AFTER MENOPAUSE: ICD-10-CM

## 2023-12-20 PROCEDURE — G0101 CA SCREEN;PELVIC/BREAST EXAM: HCPCS | Performed by: OBSTETRICS & GYNECOLOGY

## 2023-12-20 NOTE — PROGRESS NOTES
Assessment/Plan:    No problem-specific Assessment & Plan notes found for this encounter.       Diagnoses and all orders for this visit:    Encounter for screening mammogram for malignant neoplasm of breast  -     Mammo screening bilateral w 3d & cad; Future    Encounter for gynecological examination without abnormal finding  -     Liquid-based pap, screening    Atrophic vaginitis          Normal gynecological physical examination.  Self-breast examination stressed.  Mammogram ordered.  Discussed regular exercise, healthy diet, importance of vitamin D and calcium supplements.  Discussed importance of sun block use during periods of prolonged sun exposure.  Patient will be seen in 1 year for routine gynecologic and medical examination.  Patient will call office for any problems, concerns, or issues which may arise during the interim.     Subjective:          HPI    Patient ID: Maria M Patterson is a 73 y.o. female who presents today for her annual gynecologic and medical examination    Menstrual status: Patient is postmenopausal and denies any bleeding at this time    Vasomotor symptoms: Denies any significant vasomotor symptoms    Patient reports normal appetite    Patient reports normal bowel and bladder habits    Patient denies any significant pelvic or abdominal pain    Patient denies any headaches, chest pain, shortness of breath fever shakes or chills    Patient denies any COVID 19 symptoms including cough or loss of taste or smell    COVID vaccine status: Aware of COVID vaccination protocols    Medical problems: Followed medically for cholesterol    Colonoscopy status: Up-to-date with screening colonoscopy    Mammogram status: Patient does regular self breast exams and is up-to-date with screening mammography as well.  Appropriate arrangements for her annual screening mammogram were placed in the EMR system at today's visit.    The following portions of the patient's history were reviewed and updated as  "appropriate: allergies, current medications, past family history, past medical history, past social history, past surgical history and problem list.    Review of Systems   Constitutional: Negative.  Negative for appetite change, diaphoresis, fatigue, fever and unexpected weight change.   HENT: Negative.     Eyes: Negative.    Respiratory: Negative.     Cardiovascular: Negative.         Followed for cholesterol   Gastrointestinal: Negative.  Negative for abdominal pain, blood in stool, constipation, diarrhea, nausea and vomiting.   Endocrine: Negative.  Negative for cold intolerance and heat intolerance.        Followed for thyroid   Genitourinary: Negative.  Negative for dysuria, frequency, hematuria, urgency, vaginal bleeding, vaginal discharge and vaginal pain.   Musculoskeletal: Negative.    Skin: Negative.    Allergic/Immunologic: Negative.    Neurological: Negative.    Hematological: Negative.  Negative for adenopathy.   Psychiatric/Behavioral: Negative.           Objective:      /82   Ht 5' 6\" (1.676 m)   Wt 52.6 kg (116 lb)   BMI 18.72 kg/m²          Physical Exam  Constitutional:       General: She is not in acute distress.     Appearance: Normal appearance. She is well-developed. She is not diaphoretic.   HENT:      Head: Normocephalic and atraumatic.   Eyes:      Pupils: Pupils are equal, round, and reactive to light.   Cardiovascular:      Rate and Rhythm: Normal rate and regular rhythm.      Heart sounds: Normal heart sounds. No murmur heard.     No friction rub. No gallop.   Pulmonary:      Effort: Pulmonary effort is normal.      Breath sounds: Normal breath sounds.   Chest:   Breasts:     Breasts are symmetrical.      Right: No inverted nipple, mass, nipple discharge, skin change or tenderness.      Left: No inverted nipple, mass, nipple discharge, skin change or tenderness.   Abdominal:      General: Bowel sounds are normal.      Palpations: Abdomen is soft.   Genitourinary:     Exam " position: Supine.      Labia:         Right: No rash or lesion.         Left: No rash or lesion.       Urethra: No urethral swelling or urethral lesion.      Vagina: Normal. No vaginal discharge, erythema, tenderness or bleeding.      Cervix: No discharge or friability.      Uterus: Not enlarged and not tender.       Adnexa:         Right: No mass, tenderness or fullness.          Left: No mass, tenderness or fullness.        Rectum: Normal. Guaiac result negative.      Comments: Pelvic exam revealed mild atrophic vaginitis  Good pelvic support confirmed  Use pediatric speculum  Musculoskeletal:         General: Normal range of motion.      Cervical back: Normal range of motion and neck supple.   Lymphadenopathy:      Cervical: No cervical adenopathy.      Upper Body:      Right upper body: No supraclavicular adenopathy.      Left upper body: No supraclavicular adenopathy.   Skin:     General: Skin is warm and dry.      Findings: No rash.   Neurological:      General: No focal deficit present.      Mental Status: She is alert and oriented to person, place, and time.   Psychiatric:         Mood and Affect: Mood normal.         Speech: Speech normal.         Behavior: Behavior normal.         Thought Content: Thought content normal.         Judgment: Judgment normal.

## 2023-12-27 DIAGNOSIS — C73 PAPILLARY THYROID CARCINOMA (HCC): ICD-10-CM

## 2023-12-27 RX ORDER — LEVOTHYROXINE SODIUM 75 MCG
TABLET ORAL
Qty: 90 TABLET | Refills: 0 | Status: SHIPPED | OUTPATIENT
Start: 2023-12-27

## 2024-01-10 DIAGNOSIS — E78.2 MIXED HYPERLIPIDEMIA: ICD-10-CM

## 2024-01-10 RX ORDER — ATORVASTATIN CALCIUM 20 MG/1
10 TABLET, FILM COATED ORAL
Qty: 90 TABLET | Refills: 3 | Status: SHIPPED | OUTPATIENT
Start: 2024-01-10

## 2024-02-21 PROBLEM — Z00.00 MEDICARE ANNUAL WELLNESS VISIT, SUBSEQUENT: Status: RESOLVED | Noted: 2018-06-29 | Resolved: 2024-02-21

## 2024-05-05 DIAGNOSIS — C73 PAPILLARY THYROID CARCINOMA (HCC): ICD-10-CM

## 2024-05-06 RX ORDER — LEVOTHYROXINE SODIUM 75 MCG
TABLET ORAL
Qty: 90 TABLET | Refills: 1 | Status: SHIPPED | OUTPATIENT
Start: 2024-05-06

## 2024-06-30 LAB
T4 FREE SERPL-MCNC: 1.4 NG/DL (ref 0.8–1.8)
THYROGLOB AB SERPL-ACNC: <1 IU/ML
TSH SERPL-ACNC: 0.38 MIU/L (ref 0.4–4.5)

## 2024-07-30 ENCOUNTER — OFFICE VISIT (OUTPATIENT)
Dept: ENDOCRINOLOGY | Facility: CLINIC | Age: 74
End: 2024-07-30
Payer: COMMERCIAL

## 2024-07-30 VITALS
SYSTOLIC BLOOD PRESSURE: 138 MMHG | DIASTOLIC BLOOD PRESSURE: 80 MMHG | HEART RATE: 83 BPM | HEIGHT: 66 IN | OXYGEN SATURATION: 99 % | WEIGHT: 113.2 LBS | BODY MASS INDEX: 18.19 KG/M2

## 2024-07-30 DIAGNOSIS — E89.0 POSTOPERATIVE HYPOTHYROIDISM: Primary | ICD-10-CM

## 2024-07-30 DIAGNOSIS — C73 PAPILLARY THYROID CARCINOMA (HCC): ICD-10-CM

## 2024-07-30 PROCEDURE — 99214 OFFICE O/P EST MOD 30 MIN: CPT | Performed by: INTERNAL MEDICINE

## 2024-07-30 RX ORDER — LEVOTHYROXINE SODIUM 75 MCG
TABLET ORAL
Start: 2024-07-30

## 2024-07-30 NOTE — PROGRESS NOTES
Ambulatory Visit  Name: Maria M Patterson      : 1950      MRN: [id number]  Encounter Provider: Jameel Hilliard MD  Encounter Date: 2024   Encounter department: [facility] [place]    Assessment & Plan   1. Postoperative hypothyroidism  Assessment & Plan:  The TSH is below target.  Change Synthroid to 5 days/week 75 mcg/day.  Check TSH with reflex to free T4 in 6 weeks.  2. Papillary thyroid carcinoma (HCC)  Assessment & Plan:  There is no evidence of metastatic disease as of now.  Check thyroglobulin and thyroglobulin antibody.  Orders:  -     Synthroid 75 MCG tablet; Take 1 tablet Monday-Friday  -     TSH, 3rd generation with Free T4 reflex; Future; Expected date: 09/10/2024  -     Anti-thyroglobulin antibody; Future; Expected date: 09/10/2024  -     Thyroglobulin; Future; Expected date: 09/10/2024      History of Present Illness     Maria M Patterson is a 73 y.o. female who presents for follow-up of thyroid cancer.  She underwent a total thyroidectomy about 12 years ago.  She feels well and has no complaints.    Her postoperative hypothyroidism, she is on Synthroid.  She denies any symptoms of hypo or hyperthyroidism.    Review of Systems   Constitutional:  Negative for chills and fever.   Respiratory:  Negative for shortness of breath.    Cardiovascular:  Negative for chest pain.   Gastrointestinal:  Negative for constipation, diarrhea, nausea and vomiting.   Endocrine: Negative for cold intolerance and heat intolerance.   All other systems reviewed and are negative.    Current Outpatient Medications on File Prior to Visit   Medication Sig Dispense Refill    aspirin (ECOTRIN LOW STRENGTH) 81 mg EC tablet Take 1 tablet by mouth daily      atorvastatin (LIPITOR) 20 mg tablet Take 0.5 tablets (10 mg total) by mouth daily at bedtime 90 tablet 3    Calcium Carbonate-Vit D-Min (CALTRATE 600+D PLUS MINERALS) 600-800 MG-UNIT TABS       Multiple Vitamins-Minerals  "(CENTRUM SILVER ULTRA WOMENS PO) Take 1 tablet by mouth daily      [DISCONTINUED] Synthroid 75 MCG tablet Take 1 tablet Monday-Friday and 1/2 tablet on Saturday 90 tablet 1     No current facility-administered medications on file prior to visit.      Objective     /80   Pulse 83   Ht 5' 6\" (1.676 m)   Wt 51.3 kg (113 lb 3.2 oz)   SpO2 99%   BMI 18.27 kg/m²     Physical Exam  Vitals and nursing note reviewed.   Constitutional:       Appearance: Normal appearance.   HENT:      Head: Normocephalic and atraumatic.   Eyes:      General: No scleral icterus.        Right eye: No discharge.         Left eye: No discharge.   Neck:      Comments: Thyroid is absent.  Pulmonary:      Effort: Pulmonary effort is normal.   Musculoskeletal:         General: Normal range of motion.      Cervical back: Normal range of motion.   Lymphadenopathy:      Cervical: No cervical adenopathy.   Skin:     Coloration: Skin is not jaundiced.   Neurological:      Mental Status: She is alert and oriented to person, place, and time.   Psychiatric:         Mood and Affect: Mood normal.         Behavior: Behavior normal.       Administrative Statements           "

## 2024-07-30 NOTE — ASSESSMENT & PLAN NOTE
The TSH is below target.  Change Synthroid to 5 days/week 75 mcg/day.  Check TSH with reflex to free T4 in 6 weeks.

## 2024-07-30 NOTE — ASSESSMENT & PLAN NOTE
There is no evidence of metastatic disease as of now.  Check thyroglobulin and thyroglobulin antibody.

## 2024-08-04 LAB
ALBUMIN SERPL-MCNC: 4.4 G/DL (ref 3.6–5.1)
ALBUMIN/GLOB SERPL: 1.6 (CALC) (ref 1–2.5)
ALP SERPL-CCNC: 66 U/L (ref 37–153)
ALT SERPL-CCNC: 18 U/L (ref 6–29)
AST SERPL-CCNC: 35 U/L (ref 10–35)
BASOPHILS # BLD AUTO: 20 CELLS/UL (ref 0–200)
BASOPHILS NFR BLD AUTO: 0.5 %
BILIRUB SERPL-MCNC: 1.1 MG/DL (ref 0.2–1.2)
BUN SERPL-MCNC: 14 MG/DL (ref 7–25)
BUN/CREAT SERPL: ABNORMAL (CALC) (ref 6–22)
CALCIUM SERPL-MCNC: 9.8 MG/DL (ref 8.6–10.4)
CHLORIDE SERPL-SCNC: 102 MMOL/L (ref 98–110)
CHOLEST SERPL-MCNC: 166 MG/DL
CHOLEST/HDLC SERPL: 2 (CALC)
CO2 SERPL-SCNC: 29 MMOL/L (ref 20–32)
CREAT SERPL-MCNC: 0.73 MG/DL (ref 0.6–1)
EOSINOPHIL # BLD AUTO: 109 CELLS/UL (ref 15–500)
EOSINOPHIL NFR BLD AUTO: 2.8 %
ERYTHROCYTE [DISTWIDTH] IN BLOOD BY AUTOMATED COUNT: 12.4 % (ref 11–15)
EST. AVERAGE GLUCOSE BLD GHB EST-MCNC: 128 MG/DL
EST. AVERAGE GLUCOSE BLD GHB EST-SCNC: 7.1 MMOL/L
GFR/BSA.PRED SERPLBLD CYS-BASED-ARV: 87 ML/MIN/1.73M2
GLOBULIN SER CALC-MCNC: 2.8 G/DL (CALC) (ref 1.9–3.7)
GLUCOSE SERPL-MCNC: 104 MG/DL (ref 65–99)
HBA1C MFR BLD: 6.1 % OF TOTAL HGB
HCT VFR BLD AUTO: 39.4 % (ref 35–45)
HDLC SERPL-MCNC: 84 MG/DL
HGB BLD-MCNC: 12.8 G/DL (ref 11.7–15.5)
LDLC SERPL CALC-MCNC: 66 MG/DL (CALC)
LYMPHOCYTES # BLD AUTO: 1357 CELLS/UL (ref 850–3900)
LYMPHOCYTES NFR BLD AUTO: 34.8 %
MCH RBC QN AUTO: 30.5 PG (ref 27–33)
MCHC RBC AUTO-ENTMCNC: 32.5 G/DL (ref 32–36)
MCV RBC AUTO: 93.8 FL (ref 80–100)
MONOCYTES # BLD AUTO: 449 CELLS/UL (ref 200–950)
MONOCYTES NFR BLD AUTO: 11.5 %
NEUTROPHILS # BLD AUTO: 1966 CELLS/UL (ref 1500–7800)
NEUTROPHILS NFR BLD AUTO: 50.4 %
NONHDLC SERPL-MCNC: 82 MG/DL (CALC)
PLATELET # BLD AUTO: 214 THOUSAND/UL (ref 140–400)
PMV BLD REES-ECKER: 9.5 FL (ref 7.5–12.5)
POTASSIUM SERPL-SCNC: 4 MMOL/L (ref 3.5–5.3)
PROT SERPL-MCNC: 7.2 G/DL (ref 6.1–8.1)
RBC # BLD AUTO: 4.2 MILLION/UL (ref 3.8–5.1)
SODIUM SERPL-SCNC: 141 MMOL/L (ref 135–146)
TRIGL SERPL-MCNC: 79 MG/DL
WBC # BLD AUTO: 3.9 THOUSAND/UL (ref 3.8–10.8)

## 2024-08-20 ENCOUNTER — RA CDI HCC (OUTPATIENT)
Dept: OTHER | Facility: HOSPITAL | Age: 74
End: 2024-08-20

## 2024-08-20 DIAGNOSIS — Z85.850 PERSONAL HISTORY OF MALIGNANT NEOPLASM OF THYROID: Primary | ICD-10-CM

## 2024-08-26 ENCOUNTER — OFFICE VISIT (OUTPATIENT)
Dept: INTERNAL MEDICINE CLINIC | Facility: CLINIC | Age: 74
End: 2024-08-26
Payer: COMMERCIAL

## 2024-08-26 VITALS
DIASTOLIC BLOOD PRESSURE: 78 MMHG | BODY MASS INDEX: 18.29 KG/M2 | WEIGHT: 113.8 LBS | HEART RATE: 83 BPM | OXYGEN SATURATION: 98 % | HEIGHT: 66 IN | SYSTOLIC BLOOD PRESSURE: 146 MMHG

## 2024-08-26 DIAGNOSIS — R73.01 IMPAIRED FASTING GLUCOSE: ICD-10-CM

## 2024-08-26 DIAGNOSIS — E78.2 MIXED HYPERLIPIDEMIA: Primary | ICD-10-CM

## 2024-08-26 DIAGNOSIS — K51.90 ULCERATIVE COLITIS WITHOUT COMPLICATIONS, UNSPECIFIED LOCATION (HCC): ICD-10-CM

## 2024-08-26 DIAGNOSIS — Z00.00 MEDICARE ANNUAL WELLNESS VISIT, SUBSEQUENT: ICD-10-CM

## 2024-08-26 PROCEDURE — G0439 PPPS, SUBSEQ VISIT: HCPCS | Performed by: INTERNAL MEDICINE

## 2024-08-26 PROCEDURE — 99213 OFFICE O/P EST LOW 20 MIN: CPT | Performed by: INTERNAL MEDICINE

## 2024-08-26 NOTE — PATIENT INSTRUCTIONS
Medicare Preventive Visit Patient Instructions  Thank you for completing your Welcome to Medicare Visit or Medicare Annual Wellness Visit today. Your next wellness visit will be due in one year (8/27/2025).  The screening/preventive services that you may require over the next 5-10 years are detailed below. Some tests may not apply to you based off risk factors and/or age. Screening tests ordered at today's visit but not completed yet may show as past due. Also, please note that scanned in results may not display below.  Preventive Screenings:  Service Recommendations Previous Testing/Comments   Colorectal Cancer Screening  * Colonoscopy    * Fecal Occult Blood Test (FOBT)/Fecal Immunochemical Test (FIT)  * Fecal DNA/Cologuard Test  * Flexible Sigmoidoscopy Age: 45-75 years old   Colonoscopy: every 10 years (may be performed more frequently if at higher risk)  OR  FOBT/FIT: every 1 year  OR  Cologuard: every 3 years  OR  Sigmoidoscopy: every 5 years  Screening may be recommended earlier than age 45 if at higher risk for colorectal cancer. Also, an individualized decision between you and your healthcare provider will decide whether screening between the ages of 76-85 would be appropriate. Colonoscopy: 09/09/2021  FOBT/FIT: Not on file  Cologuard: Not on file  Sigmoidoscopy: Not on file          Breast Cancer Screening Age: 40+ years old  Frequency: every 1-2 years  Not required if history of left and right mastectomy Mammogram: 11/10/2023        Cervical Cancer Screening Between the ages of 21-29, pap smear recommended once every 3 years.   Between the ages of 30-65, can perform pap smear with HPV co-testing every 5 years.   Recommendations may differ for women with a history of total hysterectomy, cervical cancer, or abnormal pap smears in past. Pap Smear: 12/20/2023        Hepatitis C Screening Once for adults born between 1945 and 1965  More frequently in patients at high risk for Hepatitis C Hep C Antibody:  06/01/2017        Diabetes Screening 1-2 times per year if you're at risk for diabetes or have pre-diabetes Fasting glucose: No results in last 5 years (No results in last 5 years)  A1C: 6.1 % of total Hgb (8/3/2024)      Cholesterol Screening Once every 5 years if you don't have a lipid disorder. May order more often based on risk factors. Lipid panel: 08/03/2024          Other Preventive Screenings Covered by Medicare:  Abdominal Aortic Aneurysm (AAA) Screening: covered once if your at risk. You're considered to be at risk if you have a family history of AAA.  Lung Cancer Screening: covers low dose CT scan once per year if you meet all of the following conditions: (1) Age 55-77; (2) No signs or symptoms of lung cancer; (3) Current smoker or have quit smoking within the last 15 years; (4) You have a tobacco smoking history of at least 20 pack years (packs per day multiplied by number of years you smoked); (5) You get a written order from a healthcare provider.  Glaucoma Screening: covered annually if you're considered high risk: (1) You have diabetes OR (2) Family history of glaucoma OR (3)  aged 50 and older OR (4)  American aged 65 and older  Osteoporosis Screening: covered every 2 years if you meet one of the following conditions: (1) You're estrogen deficient and at risk for osteoporosis based off medical history and other findings; (2) Have a vertebral abnormality; (3) On glucocorticoid therapy for more than 3 months; (4) Have primary hyperparathyroidism; (5) On osteoporosis medications and need to assess response to drug therapy.   Last bone density test (DXA Scan): 10/17/2022.  HIV Screening: covered annually if you're between the age of 15-65. Also covered annually if you are younger than 15 and older than 65 with risk factors for HIV infection. For pregnant patients, it is covered up to 3 times per pregnancy.    Immunizations:  Immunization Recommendations   Influenza Vaccine Annual  influenza vaccination during flu season is recommended for all persons aged >= 6 months who do not have contraindications   Pneumococcal Vaccine   * Pneumococcal conjugate vaccine = PCV13 (Prevnar 13), PCV15 (Vaxneuvance), PCV20 (Prevnar 20)  * Pneumococcal polysaccharide vaccine = PPSV23 (Pneumovax) Adults 19-65 yo with certain risk factors or if 65+ yo  If never received any pneumonia vaccine: recommend Prevnar 20 (PCV20)  Give PCV20 if previously received 1 dose of PCV13 or PPSV23   Hepatitis B Vaccine 3 dose series if at intermediate or high risk (ex: diabetes, end stage renal disease, liver disease)   Respiratory syncytial virus (RSV) Vaccine - COVERED BY MEDICARE PART D  * RSVPreF3 (Arexvy) CDC recommends that adults 60 years of age and older may receive a single dose of RSV vaccine using shared clinical decision-making (SCDM)   Tetanus (Td) Vaccine - COST NOT COVERED BY MEDICARE PART B Following completion of primary series, a booster dose should be given every 10 years to maintain immunity against tetanus. Td may also be given as tetanus wound prophylaxis.   Tdap Vaccine - COST NOT COVERED BY MEDICARE PART B Recommended at least once for all adults. For pregnant patients, recommended with each pregnancy.   Shingles Vaccine (Shingrix) - COST NOT COVERED BY MEDICARE PART B  2 shot series recommended in those 19 years and older who have or will have weakened immune systems or those 50 years and older     Health Maintenance Due:      Topic Date Due   • Breast Cancer Screening: Mammogram  11/10/2024   • Colorectal Cancer Screening  09/09/2026   • DXA SCAN  10/17/2027   • Hepatitis C Screening  Completed     Immunizations Due:      Topic Date Due   • COVID-19 Vaccine (7 - 2023-24 season) 12/09/2023   • Influenza Vaccine (1) 09/01/2024     Advance Directives   What are advance directives?  Advance directives are legal documents that state your wishes and plans for medical care. These plans are made ahead of time in  case you lose your ability to make decisions for yourself. Advance directives can apply to any medical decision, such as the treatments you want, and if you want to donate organs.   What are the types of advance directives?  There are many types of advance directives, and each state has rules about how to use them. You may choose a combination of any of the following:  Living will:  This is a written record of the treatment you want. You can also choose which treatments you do not want, which to limit, and which to stop at a certain time. This includes surgery, medicine, IV fluid, and tube feedings.   Durable power of  for healthcare (DPAHC):  This is a written record that states who you want to make healthcare choices for you when you are unable to make them for yourself. This person, called a proxy, is usually a family member or a friend. You may choose more than 1 proxy.  Do not resuscitate (DNR) order:  A DNR order is used in case your heart stops beating or you stop breathing. It is a request not to have certain forms of treatment, such as CPR. A DNR order may be included in other types of advance directives.  Medical directive:  This covers the care that you want if you are in a coma, near death, or unable to make decisions for yourself. You can list the treatments you want for each condition. Treatment may include pain medicine, surgery, blood transfusions, dialysis, IV or tube feedings, and a ventilator (breathing machine).  Values history:  This document has questions about your views, beliefs, and how you feel and think about life. This information can help others choose the care that you would choose.  Why are advance directives important?  An advance directive helps you control your care. Although spoken wishes may be used, it is better to have your wishes written down. Spoken wishes can be misunderstood, or not followed. Treatments may be given even if you do not want them. An advance directive  may make it easier for your family to make difficult choices about your care.   Underweight  Underweight is defined as having a body mass index (BMI) of less than 18.5 kg/m2   Anorexia  is a loss of appetite, decreased food intake, or both. Your appetite naturally decreases as you get older. You also get full faster than you used to. This occurs because your body needs less energy. Other body changes can also lead to a decreased appetite. Even though some appetite loss is normal, you still need to get enough calories and nutrients to keep you healthy. You can start to lose too much weight if you do not eat as much food as your body needs. Unwanted weight loss can cause health problems, or worsen health problems you already have. You can also become dehydrated if you do not drink enough liquid.  How to eat healthy and get enough nutrients:   Choose healthy foods.  Eat a variety of fruits, vegetables, whole grains, low-fat dairy foods, lean meats, and other protein foods. Limit foods high in fat, sugar, and salt. Limit or avoid alcohol as directed. Work with a dietitian to help you plan your meals if you need to follow a special diet. A dietitian can also teach you how to modify foods if you have trouble chewing or swallowing.   Snack on healthy foods between meals  if you only eat a small amount during meals. Snacks provide extra healthy nutrients and calories between meals. Examples include fruit, cheese, and whole grain crackers.   Drink liquids as directed  to avoid dehydration. Drink liquids between meals if they cause you to get full too quickly during meals. Ask how much liquid to drink each day and which liquids are best for you.   Use herbs, spices, and flavor enhancers to add flavor to foods.  Avoid using herbs and spice blends that also contain sodium. Ask your healthcare provider or dietitian about flavor enhancers. Flavor enhancers with ham, natural fajardo, and roast beef flavors can also be sprinkled on  food to add flavor.   Share meals with others as often as you can.  Eating with others may help you to eat better during meal time. Ask family members, neighbors, or friends to join you for lunch. There are also senior centers where you can meet people, and share meals with them.   Ask family and friends for help  with shopping or preparing foods. Ask for a ride to the grocery store, if needed.       © Copyright [employer] 2018 Information is for End User's use only and may not be sold, redistributed or otherwise used for commercial purposes. All illustrations and images included in CareNotes® are the copyrighted property of A.D.A.M., Inc. or [employer]

## 2024-08-26 NOTE — PROGRESS NOTES
Ambulatory Visit  Name: Maria M Patterson      : 1950      MRN: [id number]  Encounter Provider: Lea Reyes, MD  Encounter Date: 2024   Encounter department: MEDICAL [facility]    Assessment & Plan   1. Mixed hyperlipidemia  -     Lipid panel; Future; Expected date: 2025  -     Lipid panel  -     CBC and differential; Future; Expected date: 2025  -     Comprehensive metabolic panel; Future; Expected date: 2025  -     Lipid panel; Future; Expected date: 2025  2. Medicare annual wellness visit, subsequent  3. Impaired fasting glucose  -     Hemoglobin A1c (w/out EAG); Future; Expected date: 2025  -     Hemoglobin A1c (w/out EAG)  -     CBC and differential; Future; Expected date: 2025  -     Comprehensive metabolic panel; Future; Expected date: 2025  -     Hemoglobin A1C; Future; Expected date: 2025  4. Ulcerative colitis without complications, unspecified location (HCC)  Assessment & Plan:  In remission     Preventive health issues were discussed with patient, and age appropriate screening tests were ordered as noted in patient's After Visit Summary. Personalized health advice and appropriate referrals for health education or preventive services given if needed, as noted in patient's After Visit Summary.    History of Present Illness     HPI   Patient Care Team:  Lea Reyes, MD as PCP - [facility]  Jameel Hilliard MD as PCP - Endocrinology (Endocrinology)  MD Ulices Abraham MD Darius Desai, MD James Boylan, MD    Review of Systems   Constitutional:  Negative for unexpected weight change.   Respiratory:  Negative for shortness of breath.    Cardiovascular:  Negative for chest pain and palpitations.   Gastrointestinal:  Negative for abdominal pain, blood in stool, constipation and diarrhea.   Neurological:  Negative for dizziness and headaches.     Medical History Reviewed by provider this encounter:  Tobacco  Allergies   Meds  Problems  Med Hx  Surg Hx  Fam Hx       Annual Wellness Visit Questionnaire   Maria M is here for her Subsequent Wellness visit.     Health Risk Assessment:   Patient rates overall health as excellent. Patient feels that their physical health rating is same. Patient is very satisfied with their life. Eyesight was rated as same. Hearing was rated as same. Patient feels that their emotional and mental health rating is same. Patients states they are never, rarely angry. Patient states they are never, rarely unusually tired/fatigued. Pain experienced in the last 7 days has been some. Patient's pain rating has been 1/10. Patient states that she has experienced no weight loss or gain in last 6 months.     Depression Screening:   PHQ-2 Score: 0      Fall Risk Screening:   In the past year, patient has experienced: no history of falling in past year      Urinary Incontinence Screening:   Patient has not leaked urine accidently in the last six months.     Home Safety:  Patient does not have trouble with stairs inside or outside of their home. Patient has working smoke alarms and has working carbon monoxide detector. Home safety hazards include: none.     Nutrition:   Current diet is Regular and No Added Salt.     Medications:   Patient is currently taking over-the-counter supplements. OTC medications include: see medication list. Patient is able to manage medications.     Activities of Daily Living (ADLs)/Instrumental Activities of Daily Living (IADLs):   Walk and transfer into and out of bed and chair?: Yes  Dress and groom yourself?: Yes    Bathe or shower yourself?: Yes    Feed yourself? Yes  Do your laundry/housekeeping?: Yes  Manage your money, pay your bills and track your expenses?: Yes  Make your own meals?: Yes    Do your own shopping?: Yes    Previous Hospitalizations:   Any hospitalizations or ED visits within the last 12 months?: No      Advance Care Planning:   Living will: Yes    Durable POA for  healthcare: Yes    Advanced directive: Yes      Cognitive Screening:   Provider or family/friend/caregiver concerned regarding cognition?: No    PREVENTIVE SCREENINGS      Cardiovascular Screening:    General: Screening Not Indicated and History Lipid Disorder      Diabetes Screening:     General: Screening Current      Colorectal Cancer Screening:     General: Screening Current      Breast Cancer Screening:     General: Screening Current      Cervical Cancer Screening:    General: Screening Not Indicated      Osteoporosis Screening:    General: Screening Current      Abdominal Aortic Aneurysm (AAA) Screening:        General: Screening Not Indicated      Lung Cancer Screening:     General: Screening Not Indicated      Hepatitis C Screening:    General: Screening Current    Screening, Brief Intervention, and Referral to Treatment (SBIRT)    Screening  Typical number of drinks in a day: 0  Typical number of drinks in a week: 0  Interpretation: Low risk drinking behavior.    AUDIT-C Screenin) How often did you have a drink containing alcohol in the past year? monthly or less  2) How many drinks did you have on a typical day when you were drinking in the past year? 0  3) How often did you have 6 or more drinks on one occasion in the past year? never    AUDIT-C Score: 1  Interpretation: Score 0-2 (female): Negative screen for alcohol misuse    Single Item Drug Screening:  How often have you used an illegal drug (including marijuana) or a prescription medication for non-medical reasons in the past year? never    Single Item Drug Screen Score: 0  Interpretation: Negative screen for possible drug use disorder    Social Determinants of Health     Financial Resource Strain: Low Risk  (2023)    Overall Financial Resource Strain (CARDIA)    • Difficulty of Paying Living Expenses: Not hard at all   Food Insecurity: No Food Insecurity (2024)    Hunger Vital Sign    • Worried About Running Out of Food in the Last  "Year: Never true    • Ran Out of Food in the Last Year: Never true   Transportation Needs: No Transportation Needs (8/19/2024)    PRAPARE - Transportation    • Lack of Transportation (Medical): No    • Lack of Transportation (Non-Medical): No   Housing Stability: Low Risk  (8/19/2024)    Housing Stability Vital Sign    • Unable to Pay for Housing in the Last Year: No    • Number of Times Moved in the Last Year: 0    • Homeless in the Last Year: No   Utilities: Not At Risk (8/19/2024)    [facility] Utilities    • Threatened with loss of utilities: No     No results found.    Objective     /78   Pulse 83   Ht 5' 6\" (1.676 m)   Wt 51.6 kg (113 lb 12.8 oz)   SpO2 98%   BMI 18.37 kg/m²     Physical Exam  Constitutional:       General: She is not in acute distress.     Appearance: She is well-developed. She is not ill-appearing, toxic-appearing or diaphoretic.   HENT:      Right Ear: External ear normal. There is no impacted cerumen.      Left Ear: External ear normal. There is no impacted cerumen.   Eyes:      Conjunctiva/sclera: Conjunctivae normal.   Cardiovascular:      Rate and Rhythm: Normal rate and regular rhythm.      Heart sounds: Normal heart sounds. No murmur heard.  Pulmonary:      Effort: Pulmonary effort is normal. No respiratory distress.      Breath sounds: Normal breath sounds. No stridor. No wheezing or rales.   Abdominal:      General: There is no distension.      Palpations: Abdomen is soft. There is no mass.      Tenderness: There is no abdominal tenderness. There is no guarding or rebound.   Musculoskeletal:      Cervical back: Neck supple.      Right lower leg: No edema.      Left lower leg: No edema.   Neurological:      Mental Status: She is alert and oriented to person, place, and time.   Psychiatric:         Mood and Affect: Mood normal.         Behavior: Behavior normal.         Thought Content: Thought content normal.         Judgment: Judgment normal.           "

## 2024-09-15 DIAGNOSIS — C73 PAPILLARY THYROID CARCINOMA (HCC): ICD-10-CM

## 2024-09-15 LAB
THYROGLOB AB SERPL-ACNC: <0.1 NG/ML
THYROGLOB AB SERPL-ACNC: <1 IU/ML
TSH SERPL-ACNC: 1.9 MIU/L (ref 0.4–4.5)

## 2024-09-17 RX ORDER — LEVOTHYROXINE SODIUM 75 MCG
TABLET ORAL
Qty: 100 TABLET | Refills: 1 | Status: SHIPPED | OUTPATIENT
Start: 2024-09-17

## 2024-09-17 NOTE — RESULT ENCOUNTER NOTE
Thyroglobulin and thyroglobulin antibody are undetectable suggesting no evidence of thyroid cancer recurrence.  TSH is in the normal range.

## 2024-09-18 ENCOUNTER — TELEPHONE (OUTPATIENT)
Dept: ENDOCRINOLOGY | Facility: CLINIC | Age: 74
End: 2024-09-18

## 2024-09-18 DIAGNOSIS — M85.80 OSTEOPENIA, UNSPECIFIED LOCATION: Primary | ICD-10-CM

## 2024-09-18 DIAGNOSIS — C73 PAPILLARY THYROID CARCINOMA (HCC): Primary | ICD-10-CM

## 2024-09-18 DIAGNOSIS — C73 PAPILLARY THYROID CARCINOMA (HCC): ICD-10-CM

## 2024-09-18 DIAGNOSIS — E89.0 POSTOPERATIVE HYPOTHYROIDISM: ICD-10-CM

## 2024-09-18 DIAGNOSIS — E78.2 MIXED HYPERLIPIDEMIA: ICD-10-CM

## 2024-09-18 DIAGNOSIS — M85.80 OSTEOPENIA, UNSPECIFIED LOCATION: ICD-10-CM

## 2024-09-18 NOTE — TELEPHONE ENCOUNTER
Placed the following orders   TSH with reflex to free T4,   thyroglobulin level and thyroglobulin antibody

## 2024-10-05 ENCOUNTER — IMMUNIZATIONS (OUTPATIENT)
Dept: INTERNAL MEDICINE CLINIC | Facility: CLINIC | Age: 74
End: 2024-10-05
Payer: COMMERCIAL

## 2024-10-05 DIAGNOSIS — Z23 ENCOUNTER FOR IMMUNIZATION: Primary | ICD-10-CM

## 2024-10-05 PROCEDURE — 90662 IIV NO PRSV INCREASED AG IM: CPT

## 2024-10-05 PROCEDURE — G0008 ADMIN INFLUENZA VIRUS VAC: HCPCS

## 2024-10-18 ENCOUNTER — HOSPITAL ENCOUNTER (OUTPATIENT)
Dept: RADIOLOGY | Age: 74
Discharge: HOME | End: 2024-10-18
Payer: COMMERCIAL

## 2024-10-18 DIAGNOSIS — M85.80 OSTEOPENIA AFTER MENOPAUSE: ICD-10-CM

## 2024-10-18 DIAGNOSIS — Z78.0 OSTEOPENIA AFTER MENOPAUSE: ICD-10-CM

## 2024-10-18 PROCEDURE — 77080 DXA BONE DENSITY AXIAL: CPT

## 2024-11-09 DIAGNOSIS — C73 PAPILLARY THYROID CARCINOMA (HCC): ICD-10-CM

## 2024-11-11 RX ORDER — LEVOTHYROXINE SODIUM 75 MCG
TABLET ORAL
Qty: 60 TABLET | Refills: 1 | Status: SHIPPED | OUTPATIENT
Start: 2024-11-11

## 2024-12-10 DIAGNOSIS — K86.2 PANCREATIC CYST: Primary | ICD-10-CM

## 2024-12-23 ENCOUNTER — ANNUAL EXAM (OUTPATIENT)
Dept: OBGYN CLINIC | Facility: CLINIC | Age: 74
End: 2024-12-23
Payer: COMMERCIAL

## 2024-12-23 VITALS — WEIGHT: 116.6 LBS | BODY MASS INDEX: 18.82 KG/M2 | DIASTOLIC BLOOD PRESSURE: 90 MMHG | SYSTOLIC BLOOD PRESSURE: 150 MMHG

## 2024-12-23 DIAGNOSIS — Z12.31 ENCOUNTER FOR SCREENING MAMMOGRAM FOR MALIGNANT NEOPLASM OF BREAST: Primary | ICD-10-CM

## 2024-12-23 DIAGNOSIS — Z01.419 ENCOUNTER FOR GYNECOLOGICAL EXAMINATION WITHOUT ABNORMAL FINDING: ICD-10-CM

## 2024-12-23 DIAGNOSIS — N95.2 ATROPHIC VAGINITIS: ICD-10-CM

## 2024-12-23 PROCEDURE — G0101 CA SCREEN;PELVIC/BREAST EXAM: HCPCS | Performed by: OBSTETRICS & GYNECOLOGY

## 2024-12-23 NOTE — PROGRESS NOTES
Assessment/Plan:    No problem-specific Assessment & Plan notes found for this encounter.       Diagnoses and all orders for this visit:    Encounter for screening mammogram for malignant neoplasm of breast  -     Mammo screening bilateral w 3d and cad; Future    Encounter for gynecological examination without abnormal finding    Atrophic vaginitis          Normal gynecological physical examination.  Self-breast examination stressed.  Mammogram ordered.  Discussed regular exercise, healthy diet, importance of vitamin D and calcium supplements.  Discussed importance of sun block use during periods of prolonged sun exposure.  Patient will be seen in 1 year for routine gynecologic and medical examination.  Patient will call office for any problems, concerns, or issues which may arise during the interim.     Subjective:          HPI    Patient ID: Maria M Patterson is a 74 y.o. female who presents today for her annual gynecologic and medical examination    Menstrual status: Patient is postmenopausal and denies any vaginal bleeding  Denies  2    Vasomotor symptoms: Denies aware COVID vaccination protocols    Patient reports normal appetite    Patient reports normal bowel and bladder habits    Patient denies any significant pelvic or abdominal pain    Patient denies any headaches, chest pain, shortness of breath fever shakes or chills    Patient denies any COVID 19 symptoms including cough or loss of taste or smell    COVID vaccine status: Aware of COVID vaccination protocols    Medical problems: Followed for cholesterol and thyroid    Colonoscopy status: Up-to-date with screening colonoscopy    Mammogram status: Aware of importance of self breast exam and is up-to-date with screening mammography.  Appropriate arrangements for her annual screening mammogram were placed into the EMR system at today's visit.    The following portions of the patient's history were reviewed and updated as appropriate: allergies, current  medications, past family history, past medical history, past social history, past surgical history and problem list.    Review of Systems   Constitutional: Negative.  Negative for appetite change, diaphoresis, fatigue, fever and unexpected weight change.   HENT: Negative.     Eyes: Negative.    Respiratory: Negative.     Cardiovascular: Negative.         Followed for cholesterol   Gastrointestinal: Negative.  Negative for abdominal pain, blood in stool, constipation, diarrhea, nausea and vomiting.   Endocrine: Negative.  Negative for cold intolerance and heat intolerance.        Followed for thyroid   Genitourinary: Negative.  Negative for dysuria, frequency, hematuria, urgency, vaginal bleeding, vaginal discharge and vaginal pain.   Musculoskeletal: Negative.    Skin: Negative.    Allergic/Immunologic: Negative.    Neurological: Negative.    Hematological: Negative.  Negative for adenopathy.   Psychiatric/Behavioral: Negative.           Objective:      /90   Wt 52.9 kg (116 lb 9.6 oz)   BMI 18.82 kg/m²          Physical Exam  Constitutional:       General: She is not in acute distress.     Appearance: Normal appearance. She is well-developed. She is not diaphoretic.   HENT:      Head: Normocephalic and atraumatic.   Eyes:      Pupils: Pupils are equal, round, and reactive to light.   Cardiovascular:      Rate and Rhythm: Normal rate and regular rhythm.      Heart sounds: Normal heart sounds. No murmur heard.     No friction rub. No gallop.   Pulmonary:      Effort: Pulmonary effort is normal.      Breath sounds: Normal breath sounds.   Chest:   Breasts:     Breasts are symmetrical.      Right: No inverted nipple, mass, nipple discharge, skin change or tenderness.      Left: No inverted nipple, mass, nipple discharge, skin change or tenderness.   Abdominal:      General: Bowel sounds are normal.      Palpations: Abdomen is soft.   Genitourinary:     General: Normal vulva.      Exam position: Supine.       Labia:         Right: No rash or lesion.         Left: No rash or lesion.       Urethra: No urethral swelling or urethral lesion.      Vagina: Normal. No vaginal discharge, erythema, tenderness or bleeding.      Cervix: No discharge or friability.      Uterus: Not enlarged and not tender.       Adnexa:         Right: No mass, tenderness or fullness.          Left: No mass, tenderness or fullness.        Rectum: Normal. Guaiac result negative.      Comments: Pelvic exam revealed mild atrophic vaginitis  Good pelvic support confirmed  Musculoskeletal:         General: Normal range of motion.      Cervical back: Normal range of motion and neck supple.   Lymphadenopathy:      Cervical: No cervical adenopathy.      Upper Body:      Right upper body: No supraclavicular adenopathy.      Left upper body: No supraclavicular adenopathy.   Skin:     General: Skin is warm and dry.      Findings: No rash.   Neurological:      General: No focal deficit present.      Mental Status: She is alert and oriented to person, place, and time.   Psychiatric:         Mood and Affect: Mood normal.         Speech: Speech normal.         Behavior: Behavior normal.         Thought Content: Thought content normal.         Judgment: Judgment normal.

## 2025-02-02 LAB
CHOLEST SERPL-MCNC: 161 MG/DL
CHOLEST/HDLC SERPL: 2 (CALC)
HBA1C MFR BLD: 6 % OF TOTAL HGB
HDLC SERPL-MCNC: 82 MG/DL
LDLC SERPL CALC-MCNC: 63 MG/DL (CALC)
NONHDLC SERPL-MCNC: 79 MG/DL (CALC)
TRIGL SERPL-MCNC: 75 MG/DL

## 2025-02-12 ENCOUNTER — RESULTS FOLLOW-UP (OUTPATIENT)
Dept: INTERNAL MEDICINE CLINIC | Facility: CLINIC | Age: 75
End: 2025-02-12

## 2025-03-22 DIAGNOSIS — E78.2 MIXED HYPERLIPIDEMIA: ICD-10-CM

## 2025-03-22 RX ORDER — ATORVASTATIN CALCIUM 20 MG/1
TABLET, FILM COATED ORAL
Qty: 45 TABLET | Refills: 7 | Status: SHIPPED | OUTPATIENT
Start: 2025-03-22

## 2025-04-26 DIAGNOSIS — C73 PAPILLARY THYROID CARCINOMA (HCC): ICD-10-CM

## 2025-04-28 RX ORDER — LEVOTHYROXINE SODIUM 75 MCG
TABLET ORAL
Qty: 60 TABLET | Refills: 1 | Status: SHIPPED | OUTPATIENT
Start: 2025-04-28

## 2025-06-12 LAB
T4 FREE SERPL-MCNC: 1.4 NG/DL (ref 0.8–1.8)
THYROGLOB AB SERPL-ACNC: <0.1 NG/ML
THYROGLOB AB SERPL-ACNC: <1 IU/ML
TSH SERPL-ACNC: 4.91 MIU/L (ref 0.4–4.5)

## 2025-06-16 ENCOUNTER — RESULTS FOLLOW-UP (OUTPATIENT)
Dept: ENDOCRINOLOGY | Facility: CLINIC | Age: 75
End: 2025-06-16

## 2025-06-17 DIAGNOSIS — C73 PAPILLARY THYROID CARCINOMA (HCC): ICD-10-CM

## 2025-06-18 DIAGNOSIS — C73 PAPILLARY THYROID CARCINOMA (HCC): Primary | ICD-10-CM

## 2025-06-18 RX ORDER — LEVOTHYROXINE SODIUM 75 MCG
TABLET ORAL
Qty: 60 TABLET | Refills: 1 | Status: SHIPPED | OUTPATIENT
Start: 2025-06-18

## 2025-08-01 ENCOUNTER — OFFICE VISIT (OUTPATIENT)
Dept: ENDOCRINOLOGY | Facility: CLINIC | Age: 75
End: 2025-08-01
Payer: COMMERCIAL

## 2025-08-01 VITALS
DIASTOLIC BLOOD PRESSURE: 86 MMHG | OXYGEN SATURATION: 98 % | HEART RATE: 77 BPM | SYSTOLIC BLOOD PRESSURE: 156 MMHG | WEIGHT: 112.6 LBS | BODY MASS INDEX: 18.09 KG/M2 | HEIGHT: 66 IN

## 2025-08-01 DIAGNOSIS — Z85.850 PERSONAL HISTORY OF MALIGNANT NEOPLASM OF THYROID: ICD-10-CM

## 2025-08-01 DIAGNOSIS — E89.0 POSTOPERATIVE HYPOTHYROIDISM: Primary | ICD-10-CM

## 2025-08-01 PROCEDURE — 99214 OFFICE O/P EST MOD 30 MIN: CPT | Performed by: INTERNAL MEDICINE

## 2025-08-01 PROCEDURE — G2211 COMPLEX E/M VISIT ADD ON: HCPCS | Performed by: INTERNAL MEDICINE
